# Patient Record
Sex: FEMALE | Race: WHITE | NOT HISPANIC OR LATINO | Employment: OTHER | URBAN - METROPOLITAN AREA
[De-identification: names, ages, dates, MRNs, and addresses within clinical notes are randomized per-mention and may not be internally consistent; named-entity substitution may affect disease eponyms.]

---

## 2019-08-05 ENCOUNTER — INITIAL CONSULT (OUTPATIENT)
Dept: ORTHOPEDICS | Facility: CLINIC | Age: 34
End: 2019-08-05
Payer: COMMERCIAL

## 2019-08-05 VITALS — BODY MASS INDEX: 19.42 KG/M2 | WEIGHT: 113.75 LBS | HEIGHT: 64 IN

## 2019-08-05 DIAGNOSIS — M54.12 CERVICAL RADICULOPATHY: Primary | ICD-10-CM

## 2019-08-05 PROCEDURE — 99204 PR OFFICE/OUTPT VISIT, NEW, LEVL IV, 45-59 MIN: ICD-10-PCS | Mod: S$GLB,,, | Performed by: ORTHOPAEDIC SURGERY

## 2019-08-05 PROCEDURE — 3008F PR BODY MASS INDEX (BMI) DOCUMENTED: ICD-10-PCS | Mod: CPTII,S$GLB,, | Performed by: ORTHOPAEDIC SURGERY

## 2019-08-05 PROCEDURE — 3008F BODY MASS INDEX DOCD: CPT | Mod: CPTII,S$GLB,, | Performed by: ORTHOPAEDIC SURGERY

## 2019-08-05 PROCEDURE — 99999 PR PBB SHADOW E&M-NEW PATIENT-LVL III: CPT | Mod: PBBFAC,,, | Performed by: ORTHOPAEDIC SURGERY

## 2019-08-05 PROCEDURE — 99204 OFFICE O/P NEW MOD 45 MIN: CPT | Mod: S$GLB,,, | Performed by: ORTHOPAEDIC SURGERY

## 2019-08-05 PROCEDURE — 99999 PR PBB SHADOW E&M-NEW PATIENT-LVL III: ICD-10-PCS | Mod: PBBFAC,,, | Performed by: ORTHOPAEDIC SURGERY

## 2019-08-05 RX ORDER — GABAPENTIN 100 MG/1
200 CAPSULE ORAL NIGHTLY
COMMUNITY
End: 2020-11-17

## 2019-08-05 RX ORDER — CYCLOBENZAPRINE HCL 10 MG
10 TABLET ORAL WEEKLY
COMMUNITY
End: 2020-11-17

## 2019-08-05 RX ORDER — MELOXICAM 15 MG/1
15 TABLET ORAL DAILY
Qty: 30 TABLET | Refills: 1 | Status: SHIPPED | OUTPATIENT
Start: 2019-08-05 | End: 2019-10-03 | Stop reason: SDUPTHER

## 2019-08-09 NOTE — PROGRESS NOTES
DATE: 8/9/2019  PATIENT: Dali Cadena    Attending Physician: Vince Swenson M.D.    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 The pain has been present since February 2017.  At that point she had the spontaneous onset of neck pain. For sometime she did have associated right upper extremity radiculopathy, although at this point the pain has migrated into her right periscapular region. The patient describes the pain as burning and aching aching in the cervical region as well as right periscapular region. The pain is worse with sitting and improved by not sleeping on the right side.  She has had no prior episodes of this. There is not current leave any associated numbness and tingling. There is mild right upper extremity subjective weakness. Prior treatments have included physical therapy, chiropractic care, gabapentin, occasional Flexeril, and 2 epidural steroid injections that were not significantly helpful but no anti-inflammatory medicines    The Patient denies myelopathic symptoms such as handwriting changes or difficulty with buttons/coins/keys. Denies perineal paresthesias, bowel/bladder dysfunction.    PAST MEDICAL/SURGICAL HISTORY:  History reviewed. No pertinent past medical history.  History reviewed. No pertinent surgical history.    Current Medications:   Current Outpatient Medications:     cyclobenzaprine (FLEXERIL) 10 MG tablet, Take 10 mg by mouth once a week., Disp: , Rfl:     gabapentin (NEURONTIN) 100 MG capsule, Take 200 mg by mouth every evening., Disp: , Rfl:     meloxicam (MOBIC) 15 MG tablet, Take 1 tablet (15 mg total) by mouth once daily., Disp: 30 tablet, Rfl: 1    Social History:   Social History     Socioeconomic History    Marital status:      Spouse name: Not on file    Number of children: Not on file    Years of education: Not on  "file    Highest education level: Not on file   Occupational History    Not on file   Social Needs    Financial resource strain: Not on file    Food insecurity:     Worry: Not on file     Inability: Not on file    Transportation needs:     Medical: Not on file     Non-medical: Not on file   Tobacco Use    Smoking status: Never Smoker   Substance and Sexual Activity    Alcohol use: Not on file    Drug use: Not on file    Sexual activity: Not on file   Lifestyle    Physical activity:     Days per week: Not on file     Minutes per session: Not on file    Stress: Not on file   Relationships    Social connections:     Talks on phone: Not on file     Gets together: Not on file     Attends Mu-ism service: Not on file     Active member of club or organization: Not on file     Attends meetings of clubs or organizations: Not on file     Relationship status: Not on file   Other Topics Concern    Not on file   Social History Narrative    Not on file       REVIEW OF SYSTEMS:  Constitution: Negative. Negative for chills, fever and night sweats.   Cardiovascular: Negative for chest pain and syncope.   Respiratory: Negative for cough and shortness of breath.   Gastrointestinal: See HPI. Negative for nausea/vomiting. Negative for abdominal pain.  Genitourinary: See HPI. Negative for discoloration or dysuria.  Skin: Negative for dry skin, itching and rash.   Hematologic/Lymphatic:  Negative for bleeding/clotting disorders.   Musculoskeletal: Negative for falls and muscle weakness.   Neurological: See HPI.  No history of seizures.  No history of cranial surgery or shunts.  Endocrine: Negative for polydipsia, polyphagia and polyuria.   Allergic/Immunologic: Negative for hives and persistent infections.  Psychiatric/Behavioral: Negative for depression and insomnia.         EXAM:  Ht 5' 3.5" (1.613 m)   Wt 51.6 kg (113 lb 12.1 oz)   BMI 19.84 kg/m²     General: The patient is a very pleasant 34 y.o. female in no " apparent distress, the patient is orientatied to person, place and time.  Psych: Normal mood and affect  HEENT: Vision grossly intact, hearing intact to the spoken word.  Lungs: Respirations unlabored.  Gait: Normal station and gait, no difficulty with toe or heel walk.   Skin: Cervical skin negative for rashes, lesions, hairy patches and surgical scars.  Range of motion: Cervical range of motion is acceptable. There is mild posterior cervical tenderness to palpation.  Spinal Balance: Global saggital and coronal spinal balance acceptable, no significant for scoliosis and kyphosis.  Musculoskeletal: No pain with the range of motion of the bilateral shoulders and elbows. Normal bulk and contour of the bilateral hands.  Vascular: Bilateral hands warm and well perfused, Radial pulses 2+ bilaterally.  Neurological: Normal strength and tone in all major motor groups in the bilateral upper and lower extremities with the exception of the right triceps which is graded as 4/5. Normal sensation to light touch in the C5-T1 and L2-S1 dermatomes bilaterally.  Deep tendon reflexes symmetric 2+ in the bilateral upper and lower extremities.  Negative Inverted Radial Reflex and Naylor's bilaterally. Negative Babinski bilaterally.     IMAGING:   Today I personally reviewed AP, Lat and Flex/Ex  upright C-spine films that demonstrate no significant loss of disc height.  Recent MRI of her cervical spine demonstrates a minimal right-sided C6-7 disc bulge     Body mass index is 19.84 kg/m².  No results found for: HGBA1C    ASSESSMENT/PLAN:  Cervical radiculopathy  -     Ambulatory Referral to Physical/Occupational Therapy    Other orders  -     meloxicam (MOBIC) 15 MG tablet; Take 1 tablet (15 mg total) by mouth once daily.  Dispense: 30 tablet; Refill: 1       Today we discussed options, certainly the patient has pain but overall she has a fairly small disc herniation.  At this point I do not recommend surgery, which I likely think would  consist of a disc replacement. For now we will try a course of Mobic as well as physical therapy.  She has not recently tried anti-inflammatory medicines.  We will see how she does.

## 2019-10-03 DIAGNOSIS — M54.12 CERVICAL RADICULOPATHY: ICD-10-CM

## 2019-10-03 DIAGNOSIS — M54.12 CERVICAL RADICULOPATHY: Primary | ICD-10-CM

## 2019-10-03 RX ORDER — MELOXICAM 15 MG/1
15 TABLET ORAL DAILY
Qty: 30 TABLET | Refills: 1 | Status: SHIPPED | OUTPATIENT
Start: 2019-10-03 | End: 2019-10-03 | Stop reason: SDUPTHER

## 2019-10-04 RX ORDER — MELOXICAM 15 MG/1
TABLET ORAL
Qty: 90 TABLET | Refills: 1 | Status: SHIPPED | OUTPATIENT
Start: 2019-10-04 | End: 2020-11-17

## 2019-10-30 ENCOUNTER — OFFICE VISIT (OUTPATIENT)
Dept: ORTHOPEDICS | Facility: CLINIC | Age: 34
End: 2019-10-30
Payer: COMMERCIAL

## 2019-10-30 VITALS — HEIGHT: 64 IN | BODY MASS INDEX: 18.5 KG/M2 | WEIGHT: 108.38 LBS

## 2019-10-30 DIAGNOSIS — M54.12 CERVICAL RADICULOPATHY: Primary | ICD-10-CM

## 2019-10-30 PROCEDURE — 99212 PR OFFICE/OUTPT VISIT, EST, LEVL II, 10-19 MIN: ICD-10-PCS | Mod: S$GLB,,, | Performed by: ORTHOPAEDIC SURGERY

## 2019-10-30 PROCEDURE — 99999 PR PBB SHADOW E&M-EST. PATIENT-LVL II: CPT | Mod: PBBFAC,,, | Performed by: ORTHOPAEDIC SURGERY

## 2019-10-30 PROCEDURE — 3008F BODY MASS INDEX DOCD: CPT | Mod: CPTII,S$GLB,, | Performed by: ORTHOPAEDIC SURGERY

## 2019-10-30 PROCEDURE — 99212 OFFICE O/P EST SF 10 MIN: CPT | Mod: S$GLB,,, | Performed by: ORTHOPAEDIC SURGERY

## 2019-10-30 PROCEDURE — 99999 PR PBB SHADOW E&M-EST. PATIENT-LVL II: ICD-10-PCS | Mod: PBBFAC,,, | Performed by: ORTHOPAEDIC SURGERY

## 2019-10-30 PROCEDURE — 3008F PR BODY MASS INDEX (BMI) DOCUMENTED: ICD-10-PCS | Mod: CPTII,S$GLB,, | Performed by: ORTHOPAEDIC SURGERY

## 2019-11-01 NOTE — PROGRESS NOTES
The patient returns for follow-up.  I saw her a few months ago for cervical radiculopathy secondary to a small cervical disc herniation.  She did physical therapy and is taking meloxicam and is doing very well. She has minimal symptoms.  She is apprehensive to stop taking the meloxicam because she is concerned that her symptoms will return.    Today we discussed that I think is very reasonable for her to transition to naproxen.  We will see how she does with that    I spent 10 minutes with the patient of which greater than 1/2 the time was devoted to counciling the patient regarding treatment options.

## 2020-04-26 DIAGNOSIS — M54.12 CERVICAL RADICULOPATHY: ICD-10-CM

## 2020-04-27 RX ORDER — MELOXICAM 15 MG/1
TABLET ORAL
Qty: 90 TABLET | Refills: 1 | OUTPATIENT
Start: 2020-04-27

## 2020-06-17 RX ORDER — MELOXICAM 15 MG/1
TABLET ORAL
Qty: 90 TABLET | Refills: 1 | OUTPATIENT
Start: 2020-06-17

## 2020-10-09 ENCOUNTER — TELEPHONE (OUTPATIENT)
Dept: OBSTETRICS AND GYNECOLOGY | Facility: CLINIC | Age: 35
End: 2020-10-09

## 2020-10-09 NOTE — TELEPHONE ENCOUNTER
----- Message from Maite Arrington sent at 10/9/2020  2:22 PM CDT -----  Regarding: Patient  Who Called: LEATHA BARBER  Who Left Message for Patient:Dina  Does the patient know what this is regarding? YES Best Call Back Number:[phone] Additional Information:

## 2020-10-14 ENCOUNTER — LAB VISIT (OUTPATIENT)
Dept: LAB | Facility: OTHER | Age: 35
End: 2020-10-14
Attending: OBSTETRICS & GYNECOLOGY
Payer: COMMERCIAL

## 2020-10-14 ENCOUNTER — OFFICE VISIT (OUTPATIENT)
Dept: OBSTETRICS AND GYNECOLOGY | Facility: CLINIC | Age: 35
End: 2020-10-14
Payer: COMMERCIAL

## 2020-10-14 VITALS
SYSTOLIC BLOOD PRESSURE: 120 MMHG | BODY MASS INDEX: 18.33 KG/M2 | DIASTOLIC BLOOD PRESSURE: 72 MMHG | WEIGHT: 107.38 LBS | HEIGHT: 64 IN

## 2020-10-14 DIAGNOSIS — R22.32 LUMP IN ARMPIT, LEFT: ICD-10-CM

## 2020-10-14 DIAGNOSIS — Z11.3 SCREEN FOR STD (SEXUALLY TRANSMITTED DISEASE): ICD-10-CM

## 2020-10-14 DIAGNOSIS — Z87.42 HISTORY OF ABNORMAL CERVICAL PAP SMEAR: ICD-10-CM

## 2020-10-14 DIAGNOSIS — Z01.419 ENCOUNTER FOR GYNECOLOGICAL EXAMINATION WITHOUT ABNORMAL FINDING: Primary | ICD-10-CM

## 2020-10-14 PROCEDURE — 86703 HIV-1/HIV-2 1 RESULT ANTBDY: CPT

## 2020-10-14 PROCEDURE — 36415 COLL VENOUS BLD VENIPUNCTURE: CPT

## 2020-10-14 PROCEDURE — 3008F BODY MASS INDEX DOCD: CPT | Mod: CPTII,S$GLB,, | Performed by: OBSTETRICS & GYNECOLOGY

## 2020-10-14 PROCEDURE — 88175 CYTOPATH C/V AUTO FLUID REDO: CPT

## 2020-10-14 PROCEDURE — 86592 SYPHILIS TEST NON-TREP QUAL: CPT

## 2020-10-14 PROCEDURE — 99385 PREV VISIT NEW AGE 18-39: CPT | Mod: S$GLB,,, | Performed by: OBSTETRICS & GYNECOLOGY

## 2020-10-14 PROCEDURE — 99385 PR PREVENTIVE VISIT,NEW,18-39: ICD-10-PCS | Mod: S$GLB,,, | Performed by: OBSTETRICS & GYNECOLOGY

## 2020-10-14 PROCEDURE — 99999 PR PBB SHADOW E&M-EST. PATIENT-LVL III: CPT | Mod: PBBFAC,,, | Performed by: OBSTETRICS & GYNECOLOGY

## 2020-10-14 PROCEDURE — 3008F PR BODY MASS INDEX (BMI) DOCUMENTED: ICD-10-PCS | Mod: CPTII,S$GLB,, | Performed by: OBSTETRICS & GYNECOLOGY

## 2020-10-14 PROCEDURE — 87624 HPV HI-RISK TYP POOLED RSLT: CPT

## 2020-10-14 PROCEDURE — 87491 CHLMYD TRACH DNA AMP PROBE: CPT

## 2020-10-14 PROCEDURE — 99999 PR PBB SHADOW E&M-EST. PATIENT-LVL III: ICD-10-PCS | Mod: PBBFAC,,, | Performed by: OBSTETRICS & GYNECOLOGY

## 2020-10-14 PROCEDURE — 80074 ACUTE HEPATITIS PANEL: CPT

## 2020-10-14 RX ORDER — DEXTROAMPHETAMINE SACCHARATE, AMPHETAMINE ASPARTATE MONOHYDRATE, DEXTROAMPHETAMINE SULFATE AND AMPHETAMINE SULFATE 2.5; 2.5; 2.5; 2.5 MG/1; MG/1; MG/1; MG/1
CAPSULE, EXTENDED RELEASE ORAL
COMMUNITY
Start: 2020-08-18

## 2020-10-14 NOTE — LETTER
October 14, 2020      Edison Dsouza MD  [address] [facility] [address]           [facility] [address]  [address] [address]  Phone: [phone]  Fax: [phone]          Patient: Dali Cadena   MR Number: [id number]   YOB: 1985   Date of Visit: 10/14/2020       Dear Edison Dsouza:    Thank you for referring Dali Cadena to me for evaluation. Attached you will find relevant portions of my assessment and plan of care.    If you have questions, please do not hesitate to call me. I look forward to following Dali Cadena along with you.    Sincerely,    Skylar Solorzano MD    Enclosure  CC:  No Recipients    If you would like to receive this communication electronically, please contact [email or web address] or [phone] to request more information on [employer] access.    For providers and/or their staff who would like to refer a patient to [facility], please contact us through our one-stop-shop provider referral line, [facility], at [phone].    If you feel you have received this communication in error or would no longer like to receive these types of communications, please e-mail [email or web address]

## 2020-10-14 NOTE — PROGRESS NOTES
"CC: Well woman exam    Dali Cadena is a 35 y.o. female  presents for a well woman exam.  She had abnormal pap at [facility].  She wants a pap here with us today  NL cycles  She has a left axilla lump that she has followed with doctor in the past. She finished breastfeeding three years ago.    History reviewed. No pertinent past medical history.    History reviewed. No pertinent surgical history.    OB History    Para Term  AB Living   1 1 1     1   SAB TAB Ectopic Multiple Live Births           1      # Outcome Date GA Lbr Tobi/2nd Weight Sex Delivery Anes PTL Lv   1 Term 16 42w0d   F Vag-Spont EPI  GER       History reviewed. No pertinent family history.    Social History     Tobacco Use    Smoking status: Never Smoker    Smokeless tobacco: Never Used   Substance Use Topics    Alcohol use: Not Currently     Frequency: Never    Drug use: Never       /72   Ht 5' 3.5" (1.613 m)   Wt 48.7 kg (107 lb 5.8 oz)   LMP 2020 (Approximate)   BMI 18.72 kg/m²     ROS:  GENERAL: Denies weight gain or weight loss. Feeling well overall.   SKIN: Denies rash or lesions.   HEAD: Denies head injury or headache.   NODES: Denies enlarged lymph nodes.   CHEST: Denies chest pain or shortness of breath.   CARDIOVASCULAR: Denies palpitations or left sided chest pain.   ABDOMEN: No abdominal pain, constipation, diarrhea, nausea, vomiting or rectal bleeding.   URINARY: No frequency, dysuria, hematuria, or burning on urination.  REPRODUCTIVE: See HPI.   BREASTS: The patient performs breast self-examination and denies pain, has a left axilla lump, NO  nipple discharge.   HEMATOLOGIC: No easy bruisability or excessive bleeding.  MUSCULOSKELETAL: Denies joint pain or swelling.   NEUROLOGIC: Denies syncope or weakness.   PSYCHIATRIC: Denies depression, anxiety or mood swings.    Physical Exam:    APPEARANCE: Well nourished, well developed, in no acute distress.  AFFECT: WNL, alert and oriented x " 3  SKIN: No acne or hirsutism  NECK: Neck symmetric without masses or thyromegaly  NODES: No inguinal, cervical, axillary, or femoral lymph node enlargement  CHEST: Good respiratory effect  ABDOMEN: Soft.  No tenderness or masses.  No hepatosplenomegaly.  No hernias.  BREASTS: Symmetrical, no skin changes or visible lesions.  Left axilla has a 3 x 2 cm palpable mass in the left axilla, NO nipple discharge bilaterally.  PELVIC: Normal external genitalia without lesions.  Normal hair distribution.  Adequate perineal body, normal urethral meatus.  Vagina moist and well rugated without lesions or discharge.  Cervix pink, without lesions, discharge or tenderness.  No significant cystocele or rectocele.  Bimanual exam shows uterus to be normal size, regular, mobile and nontender.  Adnexa without masses or tenderness.    EXTREMITIES: No edema.    ASSESSMENT AND PLAN  1. Encounter for gynecological examination without abnormal finding     2. Lump in armpit, left  US Breast Left Complete    Mammo Digital Diagnostic Bilat with Adam   3. History of abnormal cervical Pap smear  Liquid-Based Pap Smear, Screening    HPV High Risk Genotypes, PCR   4. Screen for STD (sexually transmitted disease)  C. trachomatis/N. gonorrhoeae by AMP DNA    HIV 1/2 Ag/Ab (4th Gen)    Hepatitis Panel, Acute    RPR       Vaginitis prevention including :   a. avoiding feminine products such as deoderant soaps, body wash, bubble bath, douches, scented toilet paper, deoderant tampons or pads, baby or feminine wipes, chronic pad use, etc. and   b. avoiding other vulvovaginal irritants such as long hot baths, humidity, tight, synthetic clothing, chlorine and sitting around in wet bathing suits and   c. wearing cotton underwear, avoiding thong underwear and no underwear to bed and   d. taking showers instead of baths and use a hair dryer on cool setting afterwards to dry and   e.wearing cotton to exercise and shower immediately after exercise and change  clothes and   f. using polyurethane condoms without spermicide if sexually active and symptoms are triggered by intercourse;   Diflucan and Mycolog cream use and potential side effects;   -pelvic rest until symptoms resolve.       Patient was counseled today on A.C.S. Pap guidelines and recommendations for yearly pelvic exams, mammograms and monthly self breast exams; to see her PCP for other health maintenance.     Follow up in about 1 year (around 10/14/2021), or if symptoms worsen or fail to improve.

## 2020-10-15 LAB
HAV IGM SERPL QL IA: NEGATIVE
HBV CORE IGM SERPL QL IA: NEGATIVE
HBV SURFACE AG SERPL QL IA: NEGATIVE
HCV AB SERPL QL IA: NEGATIVE
HIV 1+2 AB+HIV1 P24 AG SERPL QL IA: NEGATIVE
RPR SER QL: NORMAL

## 2020-10-23 ENCOUNTER — TELEPHONE (OUTPATIENT)
Dept: OBSTETRICS AND GYNECOLOGY | Facility: CLINIC | Age: 35
End: 2020-10-23

## 2020-10-23 ENCOUNTER — HOSPITAL ENCOUNTER (OUTPATIENT)
Dept: RADIOLOGY | Facility: HOSPITAL | Age: 35
Discharge: HOME | End: 2020-10-23
Attending: OBSTETRICS & GYNECOLOGY
Payer: COMMERCIAL

## 2020-10-23 DIAGNOSIS — R22.32 LUMP IN ARMPIT, LEFT: ICD-10-CM

## 2020-10-23 PROCEDURE — 77062 MAMMO DIGITAL DIAGNOSTIC BILAT WITH TOMO: ICD-10-PCS | Mod: 26,,, | Performed by: RADIOLOGY

## 2020-10-23 PROCEDURE — 76642 US BREAST LEFT LIMITED: ICD-10-PCS | Mod: 26,LT,, | Performed by: RADIOLOGY

## 2020-10-23 PROCEDURE — 76642 ULTRASOUND BREAST LIMITED: CPT | Mod: 26,LT,, | Performed by: RADIOLOGY

## 2020-10-23 PROCEDURE — 77066 DX MAMMO INCL CAD BI: CPT | Mod: 26,,, | Performed by: RADIOLOGY

## 2020-10-23 PROCEDURE — 77062 BREAST TOMOSYNTHESIS BI: CPT | Mod: TC

## 2020-10-23 PROCEDURE — 77066 MAMMO DIGITAL DIAGNOSTIC BILAT WITH TOMO: ICD-10-PCS | Mod: 26,,, | Performed by: RADIOLOGY

## 2020-10-23 PROCEDURE — 76642 ULTRASOUND BREAST LIMITED: CPT | Mod: TC,LT

## 2020-10-23 PROCEDURE — 77062 BREAST TOMOSYNTHESIS BI: CPT | Mod: 26,,, | Performed by: RADIOLOGY

## 2020-10-23 NOTE — TELEPHONE ENCOUNTER
----- Message from Lv Bal sent at 10/23/2020 11:20 AM CDT -----  PLEASE CALL PT SHE IS CALLING FOR HER RESULTS [phone]

## 2020-10-27 LAB
HPV HR 12 DNA SPEC QL NAA+PROBE: NEGATIVE
HPV16 AG SPEC QL: NEGATIVE
HPV18 DNA SPEC QL NAA+PROBE: POSITIVE

## 2020-11-05 ENCOUNTER — PATIENT MESSAGE (OUTPATIENT)
Dept: OBSTETRICS AND GYNECOLOGY | Facility: CLINIC | Age: 35
End: 2020-11-05

## 2020-11-09 ENCOUNTER — OFFICE VISIT (OUTPATIENT)
Dept: OBSTETRICS AND GYNECOLOGY | Facility: CLINIC | Age: 35
End: 2020-11-09
Payer: COMMERCIAL

## 2020-11-09 VITALS
WEIGHT: 106.5 LBS | HEIGHT: 64 IN | BODY MASS INDEX: 18.18 KG/M2 | DIASTOLIC BLOOD PRESSURE: 70 MMHG | SYSTOLIC BLOOD PRESSURE: 116 MMHG

## 2020-11-09 DIAGNOSIS — B97.7 HPV IN FEMALE: Primary | ICD-10-CM

## 2020-11-09 DIAGNOSIS — Z23 NEED FOR HPV VACCINE: ICD-10-CM

## 2020-11-09 PROCEDURE — 99213 PR OFFICE/OUTPT VISIT, EST, LEVL III, 20-29 MIN: ICD-10-PCS | Mod: 25,S$GLB,, | Performed by: OBSTETRICS & GYNECOLOGY

## 2020-11-09 PROCEDURE — 99999 PR PBB SHADOW E&M-EST. PATIENT-LVL III: CPT | Mod: PBBFAC,,, | Performed by: OBSTETRICS & GYNECOLOGY

## 2020-11-09 PROCEDURE — 90471 HPV VACCINE 9-VALENT 3 DOSE IM: ICD-10-PCS | Mod: S$GLB,,, | Performed by: OBSTETRICS & GYNECOLOGY

## 2020-11-09 PROCEDURE — 3008F BODY MASS INDEX DOCD: CPT | Mod: CPTII,S$GLB,, | Performed by: OBSTETRICS & GYNECOLOGY

## 2020-11-09 PROCEDURE — 3008F PR BODY MASS INDEX (BMI) DOCUMENTED: ICD-10-PCS | Mod: CPTII,S$GLB,, | Performed by: OBSTETRICS & GYNECOLOGY

## 2020-11-09 PROCEDURE — 99213 OFFICE O/P EST LOW 20 MIN: CPT | Mod: 25,S$GLB,, | Performed by: OBSTETRICS & GYNECOLOGY

## 2020-11-09 PROCEDURE — 1126F AMNT PAIN NOTED NONE PRSNT: CPT | Mod: S$GLB,,, | Performed by: OBSTETRICS & GYNECOLOGY

## 2020-11-09 PROCEDURE — 99999 PR PBB SHADOW E&M-EST. PATIENT-LVL III: ICD-10-PCS | Mod: PBBFAC,,, | Performed by: OBSTETRICS & GYNECOLOGY

## 2020-11-09 PROCEDURE — 90471 IMMUNIZATION ADMIN: CPT | Mod: S$GLB,,, | Performed by: OBSTETRICS & GYNECOLOGY

## 2020-11-09 PROCEDURE — 90651 HPV VACCINE 9-VALENT 3 DOSE IM: ICD-10-PCS | Mod: S$GLB,,, | Performed by: OBSTETRICS & GYNECOLOGY

## 2020-11-09 PROCEDURE — 90651 9VHPV VACCINE 2/3 DOSE IM: CPT | Mod: S$GLB,,, | Performed by: OBSTETRICS & GYNECOLOGY

## 2020-11-09 PROCEDURE — 1126F PR PAIN SEVERITY QUANTIFIED, NO PAIN PRESENT: ICD-10-PCS | Mod: S$GLB,,, | Performed by: OBSTETRICS & GYNECOLOGY

## 2020-11-11 LAB
FINAL PATHOLOGIC DIAGNOSIS: NORMAL
Lab: NORMAL

## 2020-11-17 ENCOUNTER — PROCEDURE VISIT (OUTPATIENT)
Dept: OBSTETRICS AND GYNECOLOGY | Facility: CLINIC | Age: 35
End: 2020-11-17
Payer: COMMERCIAL

## 2020-11-17 VITALS
SYSTOLIC BLOOD PRESSURE: 112 MMHG | HEIGHT: 64 IN | DIASTOLIC BLOOD PRESSURE: 66 MMHG | WEIGHT: 108.13 LBS | BODY MASS INDEX: 18.46 KG/M2

## 2020-11-17 DIAGNOSIS — B97.7 HIGH RISK HPV INFECTION: Primary | ICD-10-CM

## 2020-11-17 DIAGNOSIS — N87.9 CERVICAL DYSPLASIA: ICD-10-CM

## 2020-11-17 LAB
B-HCG UR QL: NEGATIVE
CTP QC/QA: YES

## 2020-11-17 PROCEDURE — 88305 TISSUE EXAM BY PATHOLOGIST: CPT | Mod: 59 | Performed by: PATHOLOGY

## 2020-11-17 PROCEDURE — 88305 TISSUE EXAM BY PATHOLOGIST: CPT | Mod: 26,,, | Performed by: PATHOLOGY

## 2020-11-17 PROCEDURE — 57454 COLPOSCOPY-TODAY: ICD-10-PCS | Mod: S$GLB,,, | Performed by: OBSTETRICS & GYNECOLOGY

## 2020-11-17 PROCEDURE — 88305 TISSUE EXAM BY PATHOLOGIST: ICD-10-PCS | Mod: 26,,, | Performed by: PATHOLOGY

## 2020-11-17 PROCEDURE — 57454 BX/CURETT OF CERVIX W/SCOPE: CPT | Mod: S$GLB,,, | Performed by: OBSTETRICS & GYNECOLOGY

## 2020-11-17 NOTE — PROCEDURES
Colposcopy-Today    Date/Time: 11/17/2020 11:15 AM  Performed by: Skylar Solorzano MD  Authorized by: Skylar Solorzano MD     Consent Done?:  Yes (Written)  Timeout:Immediately prior to procedure a time out was called to verify the correct patient, procedure, equipment, support staff and site/side marked as required  Prep:Patient was prepped and draped in the usual sterile fashion  Assistants?: Yes    List of assistants:  ANDREA Garcia I was present for the entire procedure.    Colposcopy Site:  Cervix   Patient was prepped and draped in the normal sterile fashion.  Acrowhite Lesion? Yes    Atypical Vessels: No    Transformation Zone Adequate?: Yes    Biopsy?: Yes         Location:  Cervix ((12 00 and 7 00))  ECC Performed?: Yes    LEEP Performed?: No    Estimated blood loss (cc):  5   Patient tolerated the procedure well with no immediate complications.

## 2020-11-23 LAB
FINAL PATHOLOGIC DIAGNOSIS: NORMAL
GROSS: NORMAL

## 2020-12-16 ENCOUNTER — OFFICE VISIT (OUTPATIENT)
Dept: OPHTHALMOLOGY | Facility: CLINIC | Age: 35
End: 2020-12-16
Payer: MEDICAID

## 2020-12-16 DIAGNOSIS — S05.31XA CONJUNCTIVAL LACERATION, RIGHT, INITIAL ENCOUNTER: Primary | ICD-10-CM

## 2020-12-16 DIAGNOSIS — H11.31 TRAUMATIC SUBCONJUNCTIVAL HEMORRHAGE OF RIGHT EYE: ICD-10-CM

## 2020-12-16 PROCEDURE — 99999 PR PBB SHADOW E&M-EST. PATIENT-LVL II: CPT | Mod: PBBFAC,,, | Performed by: OPHTHALMOLOGY

## 2020-12-16 PROCEDURE — 99212 OFFICE O/P EST SF 10 MIN: CPT | Mod: PBBFAC | Performed by: OPHTHALMOLOGY

## 2020-12-16 PROCEDURE — 92002 PR EYE EXAM, NEW PATIENT,INTERMED: ICD-10-PCS | Mod: S$PBB,,, | Performed by: OPHTHALMOLOGY

## 2020-12-16 PROCEDURE — 92002 INTRM OPH EXAM NEW PATIENT: CPT | Mod: S$PBB,,, | Performed by: OPHTHALMOLOGY

## 2020-12-16 PROCEDURE — 99999 PR PBB SHADOW E&M-EST. PATIENT-LVL II: ICD-10-PCS | Mod: PBBFAC,,, | Performed by: OPHTHALMOLOGY

## 2020-12-16 RX ORDER — OFLOXACIN 3 MG/ML
SOLUTION/ DROPS OPHTHALMIC
COMMUNITY
Start: 2020-12-10 | End: 2022-01-12

## 2020-12-16 RX ORDER — PREDNISOLONE ACETATE 10 MG/ML
SUSPENSION/ DROPS OPHTHALMIC
COMMUNITY
Start: 2020-12-10 | End: 2022-01-12

## 2020-12-16 NOTE — PROGRESS NOTES
HPI     Triage pt  S/p Lasik(Laser [facility] [place])surg OU x 1 week ago. Cat hit eye next day.  Pt states OD bulbar conj laceration.  Vision fine.  No eye pain.  Eye drops:Pred forte QID OU                    Ofloxacin QID OU    Last edited by Princess Royal MA on 12/16/2020  3:22 PM. (History)            Assessment /Plan     For exam results, see Encounter Report.    Conjunctival laceration, right, initial encounter    Traumatic subconjunctival hemorrhage of right eye      Continue current drops as prescribed. Return if signs of worsening.

## 2020-12-22 ENCOUNTER — TELEPHONE (OUTPATIENT)
Dept: OBSTETRICS AND GYNECOLOGY | Facility: CLINIC | Age: 35
End: 2020-12-22

## 2020-12-22 DIAGNOSIS — Z23 NEED FOR HPV VACCINE: Primary | ICD-10-CM

## 2020-12-22 NOTE — TELEPHONE ENCOUNTER
----- Message from Елена Mota sent at 12/22/2020 10:33 AM CST -----  Patient would like to schedule her 2nd HPV vaccine    Please contact patient at [phone]

## 2021-01-08 ENCOUNTER — CLINICAL SUPPORT (OUTPATIENT)
Dept: OBSTETRICS AND GYNECOLOGY | Facility: CLINIC | Age: 36
End: 2021-01-08
Payer: MEDICAID

## 2021-01-08 ENCOUNTER — TELEPHONE (OUTPATIENT)
Dept: OBSTETRICS AND GYNECOLOGY | Facility: CLINIC | Age: 36
End: 2021-01-08

## 2021-01-08 DIAGNOSIS — Z23 NEED FOR HPV VACCINE: ICD-10-CM

## 2021-01-08 PROCEDURE — 90471 IMMUNIZATION ADMIN: CPT | Mod: PBBFAC

## 2021-02-01 ENCOUNTER — HOSPITAL ENCOUNTER (OUTPATIENT)
Dept: RADIOLOGY | Facility: HOSPITAL | Age: 36
Discharge: HOME | End: 2021-02-01
Attending: PHYSICIAN ASSISTANT
Payer: MEDICAID

## 2021-02-01 ENCOUNTER — OFFICE VISIT (OUTPATIENT)
Dept: ORTHOPEDICS | Facility: CLINIC | Age: 36
End: 2021-02-01
Payer: MEDICAID

## 2021-02-01 VITALS
HEART RATE: 59 BPM | DIASTOLIC BLOOD PRESSURE: 76 MMHG | BODY MASS INDEX: 19.46 KG/M2 | SYSTOLIC BLOOD PRESSURE: 116 MMHG | WEIGHT: 111.63 LBS

## 2021-02-01 DIAGNOSIS — M75.82 ROTATOR CUFF TENDINITIS, LEFT: Primary | ICD-10-CM

## 2021-02-01 DIAGNOSIS — M25.512 LEFT SHOULDER PAIN, UNSPECIFIED CHRONICITY: ICD-10-CM

## 2021-02-01 DIAGNOSIS — M25.512 ACUTE PAIN OF LEFT SHOULDER: ICD-10-CM

## 2021-02-01 DIAGNOSIS — M25.512 LEFT SHOULDER PAIN, UNSPECIFIED CHRONICITY: Primary | ICD-10-CM

## 2021-02-01 PROCEDURE — 73030 X-RAY EXAM OF SHOULDER: CPT | Mod: 26,LT,, | Performed by: RADIOLOGY

## 2021-02-01 PROCEDURE — 73030 X-RAY EXAM OF SHOULDER: CPT | Mod: TC,FY,LT

## 2021-02-01 PROCEDURE — 73030 XR SHOULDER COMPLETE 2 OR MORE VIEWS LEFT: ICD-10-PCS | Mod: 26,LT,, | Performed by: RADIOLOGY

## 2021-02-01 PROCEDURE — 99999 PR PBB SHADOW E&M-EST. PATIENT-LVL III: CPT | Mod: PBBFAC,,, | Performed by: PHYSICIAN ASSISTANT

## 2021-02-01 PROCEDURE — 99999 PR PBB SHADOW E&M-EST. PATIENT-LVL III: ICD-10-PCS | Mod: PBBFAC,,, | Performed by: PHYSICIAN ASSISTANT

## 2021-02-01 PROCEDURE — 99213 OFFICE O/P EST LOW 20 MIN: CPT | Mod: PBBFAC,25,PN | Performed by: PHYSICIAN ASSISTANT

## 2021-02-01 PROCEDURE — 99214 PR OFFICE/OUTPT VISIT, EST, LEVL IV, 30-39 MIN: ICD-10-PCS | Mod: S$PBB,,, | Performed by: PHYSICIAN ASSISTANT

## 2021-02-01 PROCEDURE — 99214 OFFICE O/P EST MOD 30 MIN: CPT | Mod: S$PBB,,, | Performed by: PHYSICIAN ASSISTANT

## 2021-02-01 RX ORDER — MELOXICAM 15 MG/1
15 TABLET ORAL DAILY
Qty: 30 TABLET | Refills: 3 | Status: SHIPPED | OUTPATIENT
Start: 2021-02-01 | End: 2021-03-03

## 2021-05-31 ENCOUNTER — TELEPHONE (OUTPATIENT)
Dept: OBSTETRICS AND GYNECOLOGY | Facility: CLINIC | Age: 36
End: 2021-05-31

## 2021-06-11 ENCOUNTER — CLINICAL SUPPORT (OUTPATIENT)
Dept: OBSTETRICS AND GYNECOLOGY | Facility: CLINIC | Age: 36
End: 2021-06-11
Payer: MEDICAID

## 2021-06-11 DIAGNOSIS — Z23 NEED FOR HPV VACCINE: ICD-10-CM

## 2021-06-11 PROCEDURE — 90471 IMMUNIZATION ADMIN: CPT | Mod: PBBFAC

## 2021-11-24 ENCOUNTER — TELEPHONE (OUTPATIENT)
Dept: OBSTETRICS AND GYNECOLOGY | Facility: CLINIC | Age: 36
End: 2021-11-24
Payer: MEDICAID

## 2022-01-12 ENCOUNTER — OFFICE VISIT (OUTPATIENT)
Dept: OBSTETRICS AND GYNECOLOGY | Facility: CLINIC | Age: 37
End: 2022-01-12
Payer: MEDICAID

## 2022-01-12 VITALS
BODY MASS INDEX: 20.12 KG/M2 | SYSTOLIC BLOOD PRESSURE: 110 MMHG | HEIGHT: 63 IN | DIASTOLIC BLOOD PRESSURE: 72 MMHG | WEIGHT: 113.56 LBS

## 2022-01-12 DIAGNOSIS — Z01.419 ENCOUNTER FOR GYNECOLOGICAL EXAMINATION WITHOUT ABNORMAL FINDING: Primary | ICD-10-CM

## 2022-01-12 PROCEDURE — 99999 PR PBB SHADOW E&M-EST. PATIENT-LVL III: CPT | Mod: PBBFAC,,, | Performed by: OBSTETRICS & GYNECOLOGY

## 2022-01-12 PROCEDURE — 3008F PR BODY MASS INDEX (BMI) DOCUMENTED: ICD-10-PCS | Mod: CPTII,,, | Performed by: OBSTETRICS & GYNECOLOGY

## 2022-01-12 PROCEDURE — 3074F PR MOST RECENT SYSTOLIC BLOOD PRESSURE < 130 MM HG: ICD-10-PCS | Mod: CPTII,,, | Performed by: OBSTETRICS & GYNECOLOGY

## 2022-01-12 PROCEDURE — 3078F PR MOST RECENT DIASTOLIC BLOOD PRESSURE < 80 MM HG: ICD-10-PCS | Mod: CPTII,,, | Performed by: OBSTETRICS & GYNECOLOGY

## 2022-01-12 PROCEDURE — 1160F RVW MEDS BY RX/DR IN RCRD: CPT | Mod: CPTII,,, | Performed by: OBSTETRICS & GYNECOLOGY

## 2022-01-12 PROCEDURE — 99395 PR PREVENTIVE VISIT,EST,18-39: ICD-10-PCS | Mod: S$PBB,,, | Performed by: OBSTETRICS & GYNECOLOGY

## 2022-01-12 PROCEDURE — 87624 HPV HI-RISK TYP POOLED RSLT: CPT | Performed by: OBSTETRICS & GYNECOLOGY

## 2022-01-12 PROCEDURE — 88141 PR  CYTOPATH CERV/VAG INTERPRET: ICD-10-PCS | Mod: ,,, | Performed by: PATHOLOGY

## 2022-01-12 PROCEDURE — 3074F SYST BP LT 130 MM HG: CPT | Mod: CPTII,,, | Performed by: OBSTETRICS & GYNECOLOGY

## 2022-01-12 PROCEDURE — 1159F MED LIST DOCD IN RCRD: CPT | Mod: CPTII,,, | Performed by: OBSTETRICS & GYNECOLOGY

## 2022-01-12 PROCEDURE — 88141 CYTOPATH C/V INTERPRET: CPT | Mod: ,,, | Performed by: PATHOLOGY

## 2022-01-12 PROCEDURE — 3078F DIAST BP <80 MM HG: CPT | Mod: CPTII,,, | Performed by: OBSTETRICS & GYNECOLOGY

## 2022-01-12 PROCEDURE — 1160F PR REVIEW ALL MEDS BY PRESCRIBER/CLIN PHARMACIST DOCUMENTED: ICD-10-PCS | Mod: CPTII,,, | Performed by: OBSTETRICS & GYNECOLOGY

## 2022-01-12 PROCEDURE — 1159F PR MEDICATION LIST DOCUMENTED IN MEDICAL RECORD: ICD-10-PCS | Mod: CPTII,,, | Performed by: OBSTETRICS & GYNECOLOGY

## 2022-01-12 PROCEDURE — 3008F BODY MASS INDEX DOCD: CPT | Mod: CPTII,,, | Performed by: OBSTETRICS & GYNECOLOGY

## 2022-01-12 PROCEDURE — 88175 CYTOPATH C/V AUTO FLUID REDO: CPT | Performed by: PATHOLOGY

## 2022-01-12 PROCEDURE — 99395 PREV VISIT EST AGE 18-39: CPT | Mod: S$PBB,,, | Performed by: OBSTETRICS & GYNECOLOGY

## 2022-01-12 PROCEDURE — 99213 OFFICE O/P EST LOW 20 MIN: CPT | Mod: PBBFAC | Performed by: OBSTETRICS & GYNECOLOGY

## 2022-01-12 PROCEDURE — 99999 PR PBB SHADOW E&M-EST. PATIENT-LVL III: ICD-10-PCS | Mod: PBBFAC,,, | Performed by: OBSTETRICS & GYNECOLOGY

## 2022-01-12 NOTE — PROGRESS NOTES
"CC: Well woman exam    Dali Cadena is a 36 y.o. female  presents for a well woman exam.  She has no issues, problems, or complaints.  History of HPV. NL pap,  HPV present last year    History reviewed. No pertinent past medical history.    History reviewed. No pertinent surgical history.    OB History    Para Term  AB Living   1 1 1     1   SAB IAB Ectopic Multiple Live Births           1      # Outcome Date GA Lbr Tobi/2nd Weight Sex Delivery Anes PTL Lv   1 Term 16 42w0d   F Vag-Spont EPI  GER       Family History   Problem Relation Age of Onset    Stroke Paternal Grandmother     Hypertension Paternal Grandmother        Social History     Tobacco Use    Smoking status: Never Smoker    Smokeless tobacco: Never Used   Substance Use Topics    Alcohol use: Not Currently    Drug use: Never       /72   Ht 5' 3" (1.6 m)   Wt 51.5 kg (113 lb 8.6 oz)   LMP  (LMP Unknown)   BMI 20.11 kg/m²     ROS:  GENERAL: Denies weight gain or weight loss. Feeling well overall.   SKIN: Denies rash or lesions.   HEAD: Denies head injury or headache.   NODES: Denies enlarged lymph nodes.   CHEST: Denies chest pain or shortness of breath.   CARDIOVASCULAR: Denies palpitations or left sided chest pain.   ABDOMEN: No abdominal pain, constipation, diarrhea, nausea, vomiting or rectal bleeding.   URINARY: No frequency, dysuria, hematuria, or burning on urination.  REPRODUCTIVE: See HPI.   BREASTS: The patient performs breast self-examination and denies pain, lumps, or nipple discharge.   HEMATOLOGIC: No easy bruisability or excessive bleeding.  MUSCULOSKELETAL: Denies joint pain or swelling.   NEUROLOGIC: Denies syncope or weakness.   PSYCHIATRIC: Denies depression, anxiety or mood swings.    Physical Exam:    APPEARANCE: Well nourished, well developed, in no acute distress.  AFFECT: WNL, alert and oriented x 3  SKIN: No acne or hirsutism  NECK: Neck symmetric without masses or thyromegaly  NODES: No " inguinal, cervical, axillary, or femoral lymph node enlargement  CHEST: Good respiratory effect  ABDOMEN: Soft.  No tenderness or masses.  No hepatosplenomegaly.  No hernias.  BREASTS: Symmetrical, no skin changes or visible lesions.  No palpable masses, nipple discharge bilaterally.  PELVIC: Normal external genitalia without lesions.  Normal hair distribution.  Adequate perineal body, normal urethral meatus.  Vagina moist and well rugated without lesions or discharge.  Cervix pink, without lesions, discharge or tenderness.  No significant cystocele or rectocele.  Bimanual exam shows uterus to be normal size, regular, mobile and nontender.  Adnexa without masses or tenderness.    EXTREMITIES: No edema.    ASSESSMENT AND PLAN  1. Encounter for gynecological examination without abnormal finding  Liquid-Based Pap Smear, Screening    HPV High Risk Genotypes, PCR       Patient was counseled today on A.C.S. Pap guidelines and recommendations for yearly pelvic exams, mammograms and monthly self breast exams; to see her PCP for other health maintenance.     Follow up in about 1 year (around 1/12/2023), or if symptoms worsen or fail to improve.

## 2022-01-21 LAB
FINAL PATHOLOGIC DIAGNOSIS: ABNORMAL
HPV HR 12 DNA SPEC QL NAA+PROBE: NEGATIVE
HPV16 AG SPEC QL: NEGATIVE
HPV18 DNA SPEC QL NAA+PROBE: POSITIVE
Lab: ABNORMAL

## 2022-01-23 ENCOUNTER — PATIENT MESSAGE (OUTPATIENT)
Dept: OBSTETRICS AND GYNECOLOGY | Facility: CLINIC | Age: 37
End: 2022-01-23
Payer: MEDICAID

## 2022-01-24 ENCOUNTER — TELEPHONE (OUTPATIENT)
Dept: OBSTETRICS AND GYNECOLOGY | Facility: CLINIC | Age: 37
End: 2022-01-24
Payer: MEDICAID

## 2022-01-24 NOTE — TELEPHONE ENCOUNTER
Su Solorzano,     It was nice to see you again at my last appt.     Alicia reviewed the results. It appears unfortunately that the HPV hasnt cleared up. Also that abnormal cells and lesions were found? Is this correct?      Is it time to explore colposcopy, cryotherapy or LEEP?     Best regards

## 2022-02-02 ENCOUNTER — CLINICAL SUPPORT (OUTPATIENT)
Dept: REHABILITATION | Facility: HOSPITAL | Age: 37
End: 2022-02-02
Payer: MEDICAID

## 2022-02-02 DIAGNOSIS — M54.50 CHRONIC RIGHT-SIDED LOW BACK PAIN WITHOUT SCIATICA: ICD-10-CM

## 2022-02-02 DIAGNOSIS — G89.29 CHRONIC RIGHT-SIDED LOW BACK PAIN WITHOUT SCIATICA: ICD-10-CM

## 2022-02-02 DIAGNOSIS — M25.551 PAIN IN RIGHT HIP: Primary | ICD-10-CM

## 2022-02-02 PROCEDURE — 97162 PT EVAL MOD COMPLEX 30 MIN: CPT

## 2022-02-02 PROCEDURE — 97110 THERAPEUTIC EXERCISES: CPT

## 2022-02-02 PROCEDURE — 97140 MANUAL THERAPY 1/> REGIONS: CPT

## 2022-02-02 NOTE — PLAN OF CARE
[facility]  Physical Therapy Initial Evaluation    Date: 2/2/2022   Name: Dali Cadena  Clinic Number: [id number]    Therapy Diagnosis:   Encounter Diagnoses   Name Primary?    Pain in right hip Yes    Chronic right-sided low back pain without sciatica      Physician: Edison Dsouza MD    Physician Orders: PT Eval and Treat   Medical Diagnosis from Referral: M25.551 (ICD-10-CM) - Right hip pain  Evaluation Date: 2/2/2022  Authorization Period Expiration: 02/16/2022  Plan of Care Expiration: 5/2/2022  Visit # / Visits authorized: 1/ 1    Time In: 1405  Time Out: 1500  Total Appointment Time (timed & untimed codes): 55 minutes    Precautions: Standard    Subjective   Date of onset: College -> aggravated 6 months ago  History of current condition - Dali reports:     Patient reports onset of R hip pain in college as a runner. Pain similar to as it is now, with burning sensation in groin and some pain into R low back / hip. Patient had ART performed and this alleviated symptoms for a long period of time, but occasionally she noted pain with certain activities e.g. mountain biking. She no longer runs and has modified activities to deal with symptoms, but 6 months ago symptoms became more pronounced. Pain noted after Frisbee and lifting. She has been seen by other PT's over time with minimal relief. She comes to clinic to further address symptoms so that she can return to higher level activities.    Currently pain is aggravated with higher level activities, alleviated by rest. Some discomfort with sitting reported.     No imaging performed. Inconclusive on use of NSAIDs, but flexoril has helped in the past.      Medical History:   No past medical history on file.    Surgical History:   Dali Cadena  has no past surgical history on file.    Medications:   Dali has a current medication list which includes the following prescription(s): dextroamphetamine-amphetamine.    Allergies:   Review of  patient's allergies indicates:  No Known Allergies     Imaging, none:     Prior Therapy: yes  Social History:  lives with their family  Occupation:   Prior Level of Function: high level activities with some discomfort  Current Level of Function: high level activities and sitting pain for a day or two.    Pain:  Current 2/10, worst 7/10, best 2/10   Location: { right hip(s)  Description: Aching, Dull and Sharp  Aggravating Factors: Sitting, Walking and Lifting  Easing Factors: rest and hip flexor stretch on FR.    Pts goals: higher level activities w/o pain    Objective     Observation / Gait: inadequate nutation of SIJ    Posture: APT    Hip Range of Motion:   Right Passive Left Passive   Flexion 125 125   Extension 30 20   Ext. Rotation 75 75   Int. Rotation 20 30     Lower Extremity Strength  Right LE  Left LE    Quadriceps: 5/5 (RF dom) Quadriceps: 5/5   Hamstrings: 5/5 Hamstrings: 5/5   Hip Flexion: 4+/5 Hip Flexion: 4-/5   Hip Extension:  3+/5 Hip Extension: 3+/5   Hip ER: 4-/5 Hip ER: 4-/5   Hip IR: 3+/5 Hip IR: 4-/5   Hip ABD: 3+/5 Hip ABD: 3+/5     Flexibility:   90/90 Hamstrings: R = - ; L = -   Everardo's test: R = + ; L = np    Prem Test: Right  Left    Iliopsoas + +   Rectus Femoris  - -     Joint Mobility: hypomobile inferior and anterior glide    Special Tests:   FABERs: +  JALYN: -  Assisted SLR (SIJ stability): + on R  SIJ cluster (thigh, sacral     Functional Tests:  SL Squat Test: R: excessive valgus ; L valgus  DL Squat: WNL though not pas 90 deg      Limitation/Restriction for FOTO Hip Survey    Therapist reviewed FOTO scores for Dali Cadena on 2/2/2022.   FOTO documents entered into [employer] - see Media section.    Limitation Score: 66%         TREATMENT   Treatment Time In: 1437  Treatment Time Out: 1500  Total Treatment time (time-based codes) separate from Evaluation: 23 minutes    Dali received therapeutic exercises to develop strength, endurance, ROM, flexibility, posture and core  stabilization for 15 minutes including:    Patient education - diagnosis, prognosis, impairments, importance of core / hip stability without R hip flexor hyperactivity;  Treating symptoms; avoiding core with hip flexor use.    HEP:  Sideplank on knees x fail  Sideplank w/ clam x10 ea  Plank on elbows x fail  Plank on hands x fail  Plank shoulder taps x fail  Clam hold in neutral hip 10x10 sec ea    Next visit - SL hip abd / clam at wall; Woodpecker (PF use)    Dali received the following manual therapy techniques: Joint mobilizations were applied to the: R hip for 8 minutes, including:    Assessment  Inferior and anterior glides Grade II-III    Next visit - Cont mobs initially; possible DN    Home Exercises and Patient Education Provided    Education provided:   - see above    Written Home Exercises Provided: yes.  Exercises were reviewed and patient was able to demonstrate them prior to the end of the session.  Patient demonstrated good  understanding of the education provided.     See EMR under Patient Instructions for exercisesprovided 2/2/2022.    Assessment   Dali is a 36 y.o. female referred to outpatient Physical Therapy with a medical diagnosis of M25.551 (ICD-10-CM) - Right hip pain. Pt presents with pain, + SIJ and labral testing, + hip flexor length deficits, joint hypomobility with reduce ROM into hip ext > IR, and core / hip stability deficits. Impairments leading to burning groin pain and some LBP with higher level activities.    Pt prognosis is Fair.   Pt will benefit from skilled outpatient Physical Therapy to address the deficits stated above and in the chart below, provide pt/family education, and to maximize pt's level of independence.     Plan of care discussed with patient: Yes  Pt's spiritual, cultural and educational needs considered and patient is agreeable to the plan of care and goals as stated below:     Anticipated Barriers for therapy: chronicity    Medical Necessity is demonstrated by  the following  History  Co-morbidities and personal factors that may impact the plan of care Co-morbidities:   chronicity    Personal Factors:   no deficits     moderate   Examination  Body Structures and Functions, activity limitations and participation restrictions that may impact the plan of care Body Regions:   lower extremities    Body Systems:    ROM  strength  balance  gait  transfers  motor control  motor learning  edema    Participation Restrictions:   covid-19    Activity limitations:   Learning and applying knowledge  no deficits    General Tasks and Commands  no deficits    Communication  no deficits    Mobility  lifting and carrying objects    Self care  no deficits    Domestic Life  no deficits    Interactions/Relationships  no deficits    Life Areas  no deficits    Community and Social Life  no deficits         high   Clinical Presentation evolving clinical presentation with changing clinical characteristics moderate   Decision Making/ Complexity Score: moderate     GOALS: Short Term Goals:  0-4 weeks  1.Report decreased R hip pain  < / =  3/10  At worst to increase tolerance for high level activities  2. Increase ROM to WNL degrees where limited in order to perform ADLs without difficulty.  3. Increase strength by 1/3 MMT grade in hip ER/Ext/Abd  to increase tolerance for ADL and work activities.  4. Pt to tolerate HEP to improve ROM and independence with ADL's    Long Term Goals: 5-8 weeks  1.Report decreased R hip pain < / = 1/10 at worst  to increase tolerance for high level activities  2.Patient goal: Return to frisbee and working out w/o complaint  3.Increase strength to 4+/5 in  hip ER/Ext/Abd  to increase tolerance for ADL and work activities.  4. Pt will report at CJ level (20-40% impaired) on LEFS  to demonstrate increase in LE function with every day tasks.     Plan   Plan of care Certification: 2/2/2022 to 5/2/2022.    Outpatient Physical Therapy 1 times weekly for 8 weeks to include the  following interventions: Manual Therapy, Moist Heat/ Ice, Neuromuscular Re-ed, Patient Education, Self Care, Therapeutic Activities and Therapeutic Exercise and DN    TRA PICHARDO, PT, DPT, OCS

## 2022-02-03 ENCOUNTER — TELEPHONE (OUTPATIENT)
Dept: OBSTETRICS AND GYNECOLOGY | Facility: CLINIC | Age: 37
End: 2022-02-03
Payer: MEDICAID

## 2022-02-03 NOTE — TELEPHONE ENCOUNTER
----- Message from Skylar Solorzano MD sent at 2/1/2022  8:26 AM CST -----  Patient was sent for patient to schedule colpo for abnormal pap with HPV. Please schedule appt AP

## 2022-02-18 ENCOUNTER — CLINICAL SUPPORT (OUTPATIENT)
Dept: REHABILITATION | Facility: HOSPITAL | Age: 37
End: 2022-02-18
Payer: MEDICAID

## 2022-02-18 DIAGNOSIS — M54.50 CHRONIC RIGHT-SIDED LOW BACK PAIN WITHOUT SCIATICA: ICD-10-CM

## 2022-02-18 DIAGNOSIS — G89.29 CHRONIC RIGHT-SIDED LOW BACK PAIN WITHOUT SCIATICA: ICD-10-CM

## 2022-02-18 DIAGNOSIS — M25.551 PAIN IN RIGHT HIP: ICD-10-CM

## 2022-02-18 PROCEDURE — 97110 THERAPEUTIC EXERCISES: CPT

## 2022-02-18 NOTE — PROGRESS NOTES
Physical Therapy Daily Treatment Note     Name: Dali Carrier Clinic Number: [id number]    Therapy Diagnosis: No diagnosis found.  Physician: Edison Dsouza MD    Visit Date: 2/18/2022  Physician Orders: PT Eval and Treat   Medical Diagnosis from Referral: M25.551 (ICD-10-CM) - Right hip pain  Evaluation Date: 2/2/2022  Authorization Period Expiration: 02/16/2022  Plan of Care Expiration: 5/2/2022  Visit # / Visits authorized: 2/12     Time In: 1315  Time Out:  1400  Total Appointment Time (timed & untimed codes): 45 minutes - 30 min one on one therex     Precautions: Standard    Subjective     Pt reports: Burning pain today. Did some ART, but did not help much. Has been doing HEP exercises. No changes.    She was compliant with home exercise program.  Response to previous treatment: HEP compliance  Functional change: no changes    Pain: 5/10  Location: right hip      Objective     Dali received therapeutic exercises to develop strength, endurance, ROM, flexibility, posture and core stabilization for 30 minutes including:     Patient education - diagnosis, prognosis, impairments, importance of core / hip stability without R hip flexor hyperactivity;  Treating symptoms; avoiding core with hip flexor use.    Woodpecker 10x10 sec  Heel raise x10  DL RDL 15lbs x10  SL Hip abd into wall 10x10 sec - HEP  Sidelyhip hip abd iso at wall 5x fail - cuing foot into wall / slight elevation - HEP    Next visit - Possible DN to QL / Psoas    Held:  HEP:  Sideplank on knees x fail  Sideplank w/ clam x10 ea  Plank on elbows x fail  Plank on hands x fail  Plank shoulder taps x fail  Clam hold in neutral hip 10x10 sec ea     Dali received the following manual therapy techniques: Joint mobilizations were applied to the: R hip for 15 minutes, including:     Assessment  Traction / inferior glides  Prem stretch  Prone anterior glides Grade II-III  Reciprocal inhibition with calf, HS, glutes    Home Exercises Provided and Patient  Education Provided     Education provided:   - see above    Written Home Exercises Provided: Patient instructed to cont prior HEP.  Exercises were reviewed and Dali was able to demonstrate them prior to the end of the session.  Dali demonstrated good  understanding of the education provided.     See EMR under Patient Instructions for exercises provided prior visit - Single limb/sidelying hip abd isos above    Assessment     Burning in part due to hip flexor spasm and irritation of labrum. Able to improve tissue texture of iliopsoas with manual interventions and reciprocal inhibition activities leading to reduced pain. Significant challenge with single limb and sidelying hip abd isometrics with long holds.    If no improvement in spasming DN at next visit.    Dali Is progressing well towards her goals.   Pt prognosis is Fair.     Pt will continue to benefit from skilled outpatient physical therapy to address the deficits listed in the problem list box on initial evaluation, provide pt/family education and to maximize pt's level of independence in the home and community environment.     Pt's spiritual, cultural and educational needs considered and pt agreeable to plan of care and goals.    Anticipated barriers to physical therapy: chronicity    GOALS: Short Term Goals:  0-4 weeks  1.Report decreased R hip pain  < / =  3/10  At worst to increase tolerance for high level activities  2. Increase ROM to WNL degrees where limited in order to perform ADLs without difficulty.  3. Increase strength by 1/3 MMT grade in hip ER/Ext/Abd  to increase tolerance for ADL and work activities.  4. Pt to tolerate HEP to improve ROM and independence with ADL's     Long Term Goals: 5-8 weeks  1.Report decreased R hip pain < / = 1/10 at worst  to increase tolerance for high level activities  2.Patient goal: Return to frisbee and working out w/o complaint  3.Increase strength to 4+/5 in  hip ER/Ext/Abd  to increase tolerance for ADL and  work activities.  4. Pt will report at CJ level (20-40% impaired) on LEFS  to demonstrate increase in LE function with every day tasks.     Plan     See POC in treatment section for evaluation    TRA PICHARDO, PT , DPT, OCS

## 2022-03-03 ENCOUNTER — PROCEDURE VISIT (OUTPATIENT)
Dept: OBSTETRICS AND GYNECOLOGY | Facility: CLINIC | Age: 37
End: 2022-03-03
Payer: MEDICAID

## 2022-03-03 VITALS
BODY MASS INDEX: 20.11 KG/M2 | SYSTOLIC BLOOD PRESSURE: 116 MMHG | DIASTOLIC BLOOD PRESSURE: 68 MMHG | WEIGHT: 113.56 LBS

## 2022-03-03 DIAGNOSIS — R87.810 ATYPICAL SQUAMOUS CELL CHANGES OF UNDETERMINED SIGNIFICANCE (ASCUS) ON CERVICAL CYTOLOGY WITH POSITIVE HIGH RISK HUMAN PAPILLOMA VIRUS (HPV): Primary | ICD-10-CM

## 2022-03-03 DIAGNOSIS — R87.610 ATYPICAL SQUAMOUS CELL CHANGES OF UNDETERMINED SIGNIFICANCE (ASCUS) ON CERVICAL CYTOLOGY WITH POSITIVE HIGH RISK HUMAN PAPILLOMA VIRUS (HPV): Primary | ICD-10-CM

## 2022-03-03 LAB
B-HCG UR QL: NEGATIVE
CTP QC/QA: YES

## 2022-03-03 PROCEDURE — 88305 TISSUE EXAM BY PATHOLOGIST: ICD-10-PCS | Mod: 26,,, | Performed by: PATHOLOGY

## 2022-03-03 PROCEDURE — 88342 IMHCHEM/IMCYTCHM 1ST ANTB: CPT | Mod: 26,,, | Performed by: PATHOLOGY

## 2022-03-03 PROCEDURE — 57454 COLPOSCOPY-TODAY: ICD-10-PCS | Mod: S$PBB,,, | Performed by: OBSTETRICS & GYNECOLOGY

## 2022-03-03 PROCEDURE — 88342 CHG IMMUNOCYTOCHEMISTRY: ICD-10-PCS | Mod: 26,,, | Performed by: PATHOLOGY

## 2022-03-03 PROCEDURE — 88305 TISSUE EXAM BY PATHOLOGIST: CPT | Performed by: PATHOLOGY

## 2022-03-03 PROCEDURE — 88305 TISSUE EXAM BY PATHOLOGIST: CPT | Mod: 26,,, | Performed by: PATHOLOGY

## 2022-03-03 PROCEDURE — 57454 BX/CURETT OF CERVIX W/SCOPE: CPT | Mod: PBBFAC,PN | Performed by: OBSTETRICS & GYNECOLOGY

## 2022-03-03 PROCEDURE — 81025 URINE PREGNANCY TEST: CPT | Mod: PBBFAC,PN | Performed by: OBSTETRICS & GYNECOLOGY

## 2022-03-03 PROCEDURE — 88342 IMHCHEM/IMCYTCHM 1ST ANTB: CPT | Performed by: PATHOLOGY

## 2022-03-04 ENCOUNTER — CLINICAL SUPPORT (OUTPATIENT)
Dept: REHABILITATION | Facility: HOSPITAL | Age: 37
End: 2022-03-04
Payer: MEDICAID

## 2022-03-04 DIAGNOSIS — M54.50 LOW BACK PAIN WITHOUT SCIATICA, UNSPECIFIED BACK PAIN LATERALITY, UNSPECIFIED CHRONICITY: ICD-10-CM

## 2022-03-04 DIAGNOSIS — M25.551 PAIN IN RIGHT HIP: Primary | ICD-10-CM

## 2022-03-04 PROCEDURE — 97110 THERAPEUTIC EXERCISES: CPT

## 2022-03-04 NOTE — PROGRESS NOTES
Physical Therapy Daily Treatment Note     Name: Dali [facility] Number: [id number]    Therapy Diagnosis: No diagnosis found.  Physician: Edison Dsouza MD    Visit Date: 3/4/2022  Physician Orders: PT Eval and Treat   Medical Diagnosis from Referral: M25.551 (ICD-10-CM) - Right hip pain  Evaluation Date: 2/2/2022  Authorization Period Expiration: 02/16/2022  Plan of Care Expiration: 5/2/2022  Visit # / Visits authorized: 3/12      Time In: 1120  Time Out:  1210  Total Appointment Time (timed & untimed codes): 50 minutes - 30 min one on one therex     Precautions: Standard    Subjective     Pt reports: Reduced burning and hip irritation since last seen. Has been doing less working out and more [employer] activities though. Mod compliant with HEP.    She was compliant with home exercise program.  Response to previous treatment: HEP compliance  Functional change: no changes    Pain: 5/10  Location: right hip      Objective     Dali received therapeutic exercises to develop strength, endurance, ROM, flexibility, posture and core stabilization for 30 minutes one on one including:     Patient education - diagnosis, prognosis, impairments, importance of core / hip stability without R hip flexor hyperactivity; Treating symptoms; avoiding core with hip flexor use.    Hip thrusts x10 reps  Hip thrust 3x>30 sec holds to fail  Hip L's 3x fail ea side    Held:  Woodpecker 10x10 sec  Heel raise x10  DL RDL 15lbs x10    HEP:  SL Hip abd into wall 10x10 sec - HEP  Sidelyhip hip abd iso at wall 5x fail - cuing foot into wall / slight elevation - HEP  Sideplank on knees x fail  Sideplank w/ clam x10 ea  Plank on elbows x fail  Plank on hands x fail  Plank shoulder taps x fail  Clam hold in neutral hip 10x10 sec ea     Dali received the following manual therapy techniques: Joint mobilizations were applied to the: R hip for 20 minutes, including:     Assessment  Traction / inferior glides  Prem stretch  Prone anterior glides  Grade II-III    Dry Needling:  Patient educated on intervention of dry needling including risks, benefits, and post administration expectations including potential soreness, bruising, and fatigue. Patient demonstrated verbal understanding and consented to intervention today.    DN performed to proximal and distal psoas TrP for 10 min.    Home Exercises Provided and Patient Education Provided     Education provided:   - see above    Written Home Exercises Provided: Patient instructed to cont prior HEP.  Exercises were reviewed and Dali was able to demonstrate them prior to the end of the session.  Dali demonstrated good  understanding of the education provided.     See EMR under Patient Instructions for exercises provided prior visit - Single limb/sidelying hip abd isos above    Assessment     Improved mobility and tissue texture post manual. Improved ability to recruit gluteals with therex activities. Cont to require manual prn along with hip stability therex focused on gluteal recruitment.    Dali Is progressing well towards her goals.   Pt prognosis is Fair.     Pt will continue to benefit from skilled outpatient physical therapy to address the deficits listed in the problem list box on initial evaluation, provide pt/family education and to maximize pt's level of independence in the home and community environment.     Pt's spiritual, cultural and educational needs considered and pt agreeable to plan of care and goals.    Anticipated barriers to physical therapy: chronicity    GOALS: Short Term Goals:  0-4 weeks  1.Report decreased R hip pain  < / =  3/10  At worst to increase tolerance for high level activities  2. Increase ROM to WNL degrees where limited in order to perform ADLs without difficulty.  3. Increase strength by 1/3 MMT grade in hip ER/Ext/Abd  to increase tolerance for ADL and work activities.  4. Pt to tolerate HEP to improve ROM and independence with ADL's     Long Term Goals: 5-8  weeks  1.Report decreased R hip pain < / = 1/10 at worst  to increase tolerance for high level activities  2.Patient goal: Return to frisbee and working out w/o complaint  3.Increase strength to 4+/5 in  hip ER/Ext/Abd  to increase tolerance for ADL and work activities.  4. Pt will report at CJ level (20-40% impaired) on LEFS  to demonstrate increase in LE function with every day tasks.     Plan     See POC in treatment section for evaluation    TRA PICHARDO, PT , DPT, OCS

## 2022-03-11 ENCOUNTER — CLINICAL SUPPORT (OUTPATIENT)
Dept: REHABILITATION | Facility: HOSPITAL | Age: 37
End: 2022-03-11
Payer: MEDICAID

## 2022-03-11 DIAGNOSIS — M25.551 PAIN IN RIGHT HIP: Primary | ICD-10-CM

## 2022-03-11 DIAGNOSIS — M54.50 LOW BACK PAIN WITHOUT SCIATICA, UNSPECIFIED BACK PAIN LATERALITY, UNSPECIFIED CHRONICITY: ICD-10-CM

## 2022-03-11 PROCEDURE — 97110 THERAPEUTIC EXERCISES: CPT

## 2022-03-11 NOTE — PROGRESS NOTES
Physical Therapy Daily Treatment Note     Name: Dali [facility] Number: [id number]    Therapy Diagnosis: No diagnosis found.  Physician: Edison Dsouza MD    Visit Date: 3/11/2022  Physician Orders: PT Eval and Treat   Medical Diagnosis from Referral: M25.551 (ICD-10-CM) - Right hip pain  Evaluation Date: 2/2/2022  Authorization Period Expiration: 02/16/2022  Plan of Care Expiration: 5/2/2022  Visit # / Visits authorized: 4/12      Time In: 1120   Time Out:  1200  Total Appointment Time (timed & untimed codes): 40 minutes - 25 min one on one therex     Precautions: Standard    Subjective     Pt reports: Increased compliance with HEP. Slightly better than last week. Feels she is able to activate her glutes more. Felt min changes from DN.    Next visit - FOTO    She was compliant with home exercise program.  Response to previous treatment: HEP compliance  Functional change: no changes    Pain: 3/10  Location: right hip      Objective     Dali received therapeutic exercises to develop strength, endurance, ROM, flexibility, posture and core stabilization for 35 min (25 minutes one on one) including:     Patient education - diagnosis, prognosis, impairments, importance of core / hip stability without R hip flexor hyperactivity; Treating symptoms; avoiding core with hip flexor use.    Bike x8 min Lvl 4.0 for LE endurance and joint mobility  Hip Thrust for ROM 20x3 sec hold  Mobility squat for ROM 20x3 sec  Bird dog holds 3x30 sec ea    Next Visit - Sideplank clam hold GTB    Held:  Hip thrusts x10 reps  Hip thrust 3x>30 sec holds to fail  Hip L's 3x fail ea side  DL RDL 15lbs x10  SL Hip abd into wall 10x10 sec - HEP  Sidelyhip hip abd iso at wall 5x fail - cuing foot into wall / slight elevation - HEP  Plank shoulder taps x fail  Clam hold in neutral hip 10x10 sec ea     Dali received the following manual therapy techniques: Joint mobilizations were applied to the: R hip for 5 minutes,  including:     Assessment  Traction / inferior glides  Prem stretch  Prone anterior glides Grade II-III      Home Exercises Provided and Patient Education Provided     Education provided:   - see above    Written Home Exercises Provided: Patient instructed to cont prior HEP.  Exercises were reviewed and Dali was able to demonstrate them prior to the end of the session.  Dali demonstrated good  understanding of the education provided.     See EMR under Patient Instructions for exercises provided prior visit - Single limb/sidelying hip abd isos above    Assessment     Improved mobility and tissue texture post manual leading to increased ease of gluteal recruitment through increased ROM at end of visit.    Cont to require manual prn along with hip stability therex focused on gluteal recruitment.    Dali Is progressing well towards her goals.   Pt prognosis is Fair.     Pt will continue to benefit from skilled outpatient physical therapy to address the deficits listed in the problem list box on initial evaluation, provide pt/family education and to maximize pt's level of independence in the home and community environment.     Pt's spiritual, cultural and educational needs considered and pt agreeable to plan of care and goals.    Anticipated barriers to physical therapy: chronicity    GOALS: Short Term Goals:  0-4 weeks  1.Report decreased R hip pain  < / =  3/10  At worst to increase tolerance for high level activities  2. Increase ROM to WNL degrees where limited in order to perform ADLs without difficulty.  3. Increase strength by 1/3 MMT grade in hip ER/Ext/Abd  to increase tolerance for ADL and work activities.  4. Pt to tolerate HEP to improve ROM and independence with ADL's     Long Term Goals: 5-8 weeks  1.Report decreased R hip pain < / = 1/10 at worst  to increase tolerance for high level activities  2.Patient goal: Return to frisbee and working out w/o complaint  3.Increase strength to 4+/5 in  hip  ER/Ext/Abd  to increase tolerance for ADL and work activities.  4. Pt will report at CJ level (20-40% impaired) on LEFS  to demonstrate increase in LE function with every day tasks.     Plan     See POC in treatment section for evaluation    TRA PICHARDO, PT , DPT, OCS

## 2022-03-15 LAB
COMMENT: NORMAL
FINAL PATHOLOGIC DIAGNOSIS: NORMAL
GROSS: NORMAL
Lab: NORMAL

## 2022-03-18 ENCOUNTER — CLINICAL SUPPORT (OUTPATIENT)
Dept: REHABILITATION | Facility: HOSPITAL | Age: 37
End: 2022-03-18
Payer: MEDICAID

## 2022-03-18 DIAGNOSIS — M25.551 PAIN IN RIGHT HIP: Primary | ICD-10-CM

## 2022-03-18 DIAGNOSIS — M54.50 LOW BACK PAIN WITHOUT SCIATICA, UNSPECIFIED BACK PAIN LATERALITY, UNSPECIFIED CHRONICITY: ICD-10-CM

## 2022-03-18 PROCEDURE — 97110 THERAPEUTIC EXERCISES: CPT

## 2022-03-18 NOTE — PROGRESS NOTES
Physical Therapy Daily Treatment Note     Name: Dali Cadena  [facility] Number: [id number]    Therapy Diagnosis: No diagnosis found.  Physician: Edison Dsouza MD    Visit Date: 3/18/2022  Physician Orders: PT Eval and Treat   Medical Diagnosis from Referral: M25.551 (ICD-10-CM) - Right hip pain  Evaluation Date: 2/2/2022  Authorization Period Expiration: 02/16/2022  Plan of Care Expiration: 5/2/2022  Visit # / Visits authorized: 5/12      Time In: 1315  Time Out: 1405  Total Appointment Time (timed & untimed codes): 50 minutes - 25 min one on one therex     Precautions: Standard    Subjective     Pt reports: Overall feels better since start of care. Feels like she can activate her glutes more still, but cont to get latency pain with loading squatting / leg press.    FOTO IE - 66%  FOTO 5 -  69%    Goals - SQUAT / LEG PRESS LOADING    She was compliant with home exercise program.  Response to previous treatment: HEP compliance  Functional change: no changes    Pain: 3/10  Location: right hip      Objective     SLR SIJ stability - bilaterally; + on IE  Hip flexion ROM: R has slight ER motion; L is pure flexion    Dali received therapeutic exercises to develop strength, endurance, ROM, flexibility, posture and core stabilization for 45 min (25 minutes one on one) including:     Patient education - mobility -> stability  Patient education - stretching prior to loading / only loading with LP OR squats not both  Hip Thrust for ROM 10x10 sec hold  SL Hip abd into wall 10x10 sec  Single leg clam at wall 3x10 ea  DL Leg press 100-120 lbs 4x10 total - ER and mobility loading.    Pressback adductor stretch 3x30 se  Butterfly stretch 3x30 sec    Next Visit - Sideplank clam hold GTB    Held:  Hip L's 3x fail ea side  Bike x8 min Lvl 4.0 for LE endurance and joint mobility  Mobility squat for ROM 20x3 sec  Bird dog holds 3x30 sec ea     Dali received the following manual therapy techniques: Joint mobilizations were applied to the:  R hip for 5 minutes, including:     Assessment  Traction / inferior glides  Prem stretch  Prone anterior glides Grade II-III    Home Exercises Provided and Patient Education Provided     Education provided:   - see above    Written Home Exercises Provided: Patient instructed to cont prior HEP.  Exercises were reviewed and Dali was able to demonstrate them prior to the end of the session.  Dali demonstrated good  understanding of the education provided.     See EMR under Patient Instructions for exercises provided prior visit - Single limb/sidelying hip abd isos above    Assessment     Demonstrates improving glute activation and hip stability overall. GHJ arthrokinematics into hip flexion affecting loading mechanics into squatting and leg press with R having inadequate pure hip flexion. Improved kinematics following adductor stretching.    With improving stability of hips needs progressions to loading into deeper ROM without irritating labral structures.    Dali Is progressing well towards her goals.   Pt prognosis is Fair.     Pt will continue to benefit from skilled outpatient physical therapy to address the deficits listed in the problem list box on initial evaluation, provide pt/family education and to maximize pt's level of independence in the home and community environment.     Pt's spiritual, cultural and educational needs considered and pt agreeable to plan of care and goals.    Anticipated barriers to physical therapy: chronicity    GOALS: Short Term Goals:  0-4 weeks  1.Report decreased R hip pain  < / =  3/10  At worst to increase tolerance for high level activities  2. Increase ROM to WNL degrees where limited in order to perform ADLs without difficulty.  3. Increase strength by 1/3 MMT grade in hip ER/Ext/Abd  to increase tolerance for ADL and work activities.  4. Pt to tolerate HEP to improve ROM and independence with ADL's     Long Term Goals: 5-8 weeks  1.Report decreased R hip pain < / = 1/10 at  worst  to increase tolerance for high level activities  2.Patient goal: Return to frisbee and working out w/o complaint  3.Increase strength to 4+/5 in  hip ER/Ext/Abd  to increase tolerance for ADL and work activities.  4. Pt will report at CJ level (20-40% impaired) on LEFS  to demonstrate increase in LE function with every day tasks.     Plan     See POC in treatment section for evaluation    TRA PICHARDO, PT , DPT, OCS

## 2022-04-07 ENCOUNTER — TELEPHONE (OUTPATIENT)
Dept: OBSTETRICS AND GYNECOLOGY | Facility: CLINIC | Age: 37
End: 2022-04-07
Payer: MEDICAID

## 2022-04-19 RX ORDER — METRONIDAZOLE 500 MG/1
500 TABLET ORAL EVERY 12 HOURS
Qty: 14 TABLET | Refills: 0 | Status: SHIPPED | OUTPATIENT
Start: 2022-04-19 | End: 2022-04-26

## 2022-08-15 ENCOUNTER — OFFICE VISIT (OUTPATIENT)
Dept: ORTHOPEDICS | Facility: CLINIC | Age: 37
End: 2022-08-15
Payer: MEDICAID

## 2022-08-15 ENCOUNTER — TELEPHONE (OUTPATIENT)
Dept: SPORTS MEDICINE | Facility: CLINIC | Age: 37
End: 2022-08-15
Payer: MEDICAID

## 2022-08-15 ENCOUNTER — HOSPITAL ENCOUNTER (OUTPATIENT)
Dept: RADIOLOGY | Facility: HOSPITAL | Age: 37
Discharge: HOME | End: 2022-08-15
Attending: ORTHOPAEDIC SURGERY
Payer: MEDICAID

## 2022-08-15 VITALS — BODY MASS INDEX: 19.43 KG/M2 | WEIGHT: 109.69 LBS | HEIGHT: 63 IN

## 2022-08-15 DIAGNOSIS — M50.30 DDD (DEGENERATIVE DISC DISEASE), CERVICAL: ICD-10-CM

## 2022-08-15 DIAGNOSIS — M25.551 PAIN IN RIGHT HIP: Primary | ICD-10-CM

## 2022-08-15 PROCEDURE — 1159F MED LIST DOCD IN RCRD: CPT | Mod: CPTII,,, | Performed by: ORTHOPAEDIC SURGERY

## 2022-08-15 PROCEDURE — 99999 PR PBB SHADOW E&M-EST. PATIENT-LVL II: ICD-10-PCS | Mod: PBBFAC,,, | Performed by: ORTHOPAEDIC SURGERY

## 2022-08-15 PROCEDURE — 72050 XR CERVICAL SPINE AP LAT WITH FLEX EXTEN: ICD-10-PCS | Mod: 26,,, | Performed by: RADIOLOGY

## 2022-08-15 PROCEDURE — 99213 PR OFFICE/OUTPT VISIT, EST, LEVL III, 20-29 MIN: ICD-10-PCS | Mod: S$PBB,,, | Performed by: ORTHOPAEDIC SURGERY

## 2022-08-15 PROCEDURE — 3008F PR BODY MASS INDEX (BMI) DOCUMENTED: ICD-10-PCS | Mod: CPTII,,, | Performed by: ORTHOPAEDIC SURGERY

## 2022-08-15 PROCEDURE — 72050 X-RAY EXAM NECK SPINE 4/5VWS: CPT | Mod: TC

## 2022-08-15 PROCEDURE — 99999 PR PBB SHADOW E&M-EST. PATIENT-LVL II: CPT | Mod: PBBFAC,,, | Performed by: ORTHOPAEDIC SURGERY

## 2022-08-15 PROCEDURE — 3008F BODY MASS INDEX DOCD: CPT | Mod: CPTII,,, | Performed by: ORTHOPAEDIC SURGERY

## 2022-08-15 PROCEDURE — 72050 X-RAY EXAM NECK SPINE 4/5VWS: CPT | Mod: 26,,, | Performed by: RADIOLOGY

## 2022-08-15 PROCEDURE — 99212 OFFICE O/P EST SF 10 MIN: CPT | Mod: PBBFAC | Performed by: ORTHOPAEDIC SURGERY

## 2022-08-15 PROCEDURE — 99213 OFFICE O/P EST LOW 20 MIN: CPT | Mod: S$PBB,,, | Performed by: ORTHOPAEDIC SURGERY

## 2022-08-15 PROCEDURE — 1159F PR MEDICATION LIST DOCUMENTED IN MEDICAL RECORD: ICD-10-PCS | Mod: CPTII,,, | Performed by: ORTHOPAEDIC SURGERY

## 2022-08-15 NOTE — PROGRESS NOTES
The patient returns for follow-up.  She has a history of cervical radiculopathy that we treated conservatively.  Overall in regards to this the patient reports that she is at least 90% better.    Recently she has had bothersome right groin pain.  She notes some weakness in her iliopsoas muscles as well.    On physical examination there is normal pain with internal rotation of the right hip.    Today we discussed options, given a young patient with hip pain I think she may have labral pathology.  I will have her see Dr. Portillo in the Sports Medicine Department for evaluation.

## 2022-08-15 NOTE — TELEPHONE ENCOUNTER
----- Message from Michael Portillo MD sent at 8/15/2022  2:21 PM CDT -----  Absolutely.  We will get her in.    Thanks  Michael  ----- Message -----  From: Vince Swenson MD  Sent: 8/15/2022   2:19 PM CDT  To: Michael Portillo MD    Would you mind seeing her for R hip pain, I think she has labral pathology.

## 2023-03-02 ENCOUNTER — PATIENT MESSAGE (OUTPATIENT)
Dept: OBSTETRICS AND GYNECOLOGY | Facility: CLINIC | Age: 38
End: 2023-03-02
Payer: MEDICAID

## 2023-04-12 ENCOUNTER — OFFICE VISIT (OUTPATIENT)
Dept: OBSTETRICS AND GYNECOLOGY | Facility: CLINIC | Age: 38
End: 2023-04-12
Payer: MEDICAID

## 2023-04-12 ENCOUNTER — LAB VISIT (OUTPATIENT)
Dept: LAB | Facility: OTHER | Age: 38
End: 2023-04-12
Attending: OBSTETRICS & GYNECOLOGY
Payer: MEDICAID

## 2023-04-12 VITALS
WEIGHT: 115.75 LBS | SYSTOLIC BLOOD PRESSURE: 110 MMHG | HEIGHT: 63 IN | DIASTOLIC BLOOD PRESSURE: 78 MMHG | BODY MASS INDEX: 20.51 KG/M2

## 2023-04-12 DIAGNOSIS — N92.6 IRREGULAR MENSTRUAL CYCLE: Primary | ICD-10-CM

## 2023-04-12 DIAGNOSIS — N87.9 CERVIX DYSPLASIA: ICD-10-CM

## 2023-04-12 DIAGNOSIS — N92.6 IRREGULAR MENSTRUAL CYCLE: ICD-10-CM

## 2023-04-12 LAB
DHEA-S SERPL-MCNC: 95.8 UG/DL (ref 74.8–410.2)
ESTRADIOL SERPL-MCNC: 66 PG/ML
FSH SERPL-ACNC: 5.61 MIU/ML
LH SERPL-ACNC: 7.6 MIU/ML
PROLACTIN SERPL IA-MCNC: 7.9 NG/ML (ref 5.2–26.5)
TSH SERPL DL<=0.005 MIU/L-ACNC: 2.08 UIU/ML (ref 0.4–4)

## 2023-04-12 PROCEDURE — 88141 CYTOPATH C/V INTERPRET: CPT | Mod: ,,, | Performed by: PATHOLOGY

## 2023-04-12 PROCEDURE — 84443 ASSAY THYROID STIM HORMONE: CPT | Performed by: OBSTETRICS & GYNECOLOGY

## 2023-04-12 PROCEDURE — 36415 COLL VENOUS BLD VENIPUNCTURE: CPT | Performed by: OBSTETRICS & GYNECOLOGY

## 2023-04-12 PROCEDURE — 99999 PR PBB SHADOW E&M-EST. PATIENT-LVL III: ICD-10-PCS | Mod: PBBFAC,,, | Performed by: OBSTETRICS & GYNECOLOGY

## 2023-04-12 PROCEDURE — 83002 ASSAY OF GONADOTROPIN (LH): CPT | Performed by: OBSTETRICS & GYNECOLOGY

## 2023-04-12 PROCEDURE — 1159F PR MEDICATION LIST DOCUMENTED IN MEDICAL RECORD: ICD-10-PCS | Mod: CPTII,,, | Performed by: OBSTETRICS & GYNECOLOGY

## 2023-04-12 PROCEDURE — 87624 HPV HI-RISK TYP POOLED RSLT: CPT | Performed by: OBSTETRICS & GYNECOLOGY

## 2023-04-12 PROCEDURE — 88175 CYTOPATH C/V AUTO FLUID REDO: CPT | Performed by: PATHOLOGY

## 2023-04-12 PROCEDURE — 99213 OFFICE O/P EST LOW 20 MIN: CPT | Mod: PBBFAC | Performed by: OBSTETRICS & GYNECOLOGY

## 2023-04-12 PROCEDURE — 3078F PR MOST RECENT DIASTOLIC BLOOD PRESSURE < 80 MM HG: ICD-10-PCS | Mod: CPTII,,, | Performed by: OBSTETRICS & GYNECOLOGY

## 2023-04-12 PROCEDURE — 99999 PR PBB SHADOW E&M-EST. PATIENT-LVL III: CPT | Mod: PBBFAC,,, | Performed by: OBSTETRICS & GYNECOLOGY

## 2023-04-12 PROCEDURE — 99214 PR OFFICE/OUTPT VISIT, EST, LEVL IV, 30-39 MIN: ICD-10-PCS | Mod: S$PBB,,, | Performed by: OBSTETRICS & GYNECOLOGY

## 2023-04-12 PROCEDURE — 1160F PR REVIEW ALL MEDS BY PRESCRIBER/CLIN PHARMACIST DOCUMENTED: ICD-10-PCS | Mod: CPTII,,, | Performed by: OBSTETRICS & GYNECOLOGY

## 2023-04-12 PROCEDURE — 83036 HEMOGLOBIN GLYCOSYLATED A1C: CPT | Performed by: OBSTETRICS & GYNECOLOGY

## 2023-04-12 PROCEDURE — 1159F MED LIST DOCD IN RCRD: CPT | Mod: CPTII,,, | Performed by: OBSTETRICS & GYNECOLOGY

## 2023-04-12 PROCEDURE — 88141 PR  CYTOPATH CERV/VAG INTERPRET: ICD-10-PCS | Mod: ,,, | Performed by: PATHOLOGY

## 2023-04-12 PROCEDURE — 3074F SYST BP LT 130 MM HG: CPT | Mod: CPTII,,, | Performed by: OBSTETRICS & GYNECOLOGY

## 2023-04-12 PROCEDURE — 3008F PR BODY MASS INDEX (BMI) DOCUMENTED: ICD-10-PCS | Mod: CPTII,,, | Performed by: OBSTETRICS & GYNECOLOGY

## 2023-04-12 PROCEDURE — 84270 ASSAY OF SEX HORMONE GLOBUL: CPT | Performed by: OBSTETRICS & GYNECOLOGY

## 2023-04-12 PROCEDURE — 83498 ASY HYDROXYPROGESTERONE 17-D: CPT | Performed by: OBSTETRICS & GYNECOLOGY

## 2023-04-12 PROCEDURE — 84146 ASSAY OF PROLACTIN: CPT | Performed by: OBSTETRICS & GYNECOLOGY

## 2023-04-12 PROCEDURE — 82670 ASSAY OF TOTAL ESTRADIOL: CPT | Performed by: OBSTETRICS & GYNECOLOGY

## 2023-04-12 PROCEDURE — 82627 DEHYDROEPIANDROSTERONE: CPT | Performed by: OBSTETRICS & GYNECOLOGY

## 2023-04-12 PROCEDURE — 99214 OFFICE O/P EST MOD 30 MIN: CPT | Mod: S$PBB,,, | Performed by: OBSTETRICS & GYNECOLOGY

## 2023-04-12 PROCEDURE — 3008F BODY MASS INDEX DOCD: CPT | Mod: CPTII,,, | Performed by: OBSTETRICS & GYNECOLOGY

## 2023-04-12 PROCEDURE — 3074F PR MOST RECENT SYSTOLIC BLOOD PRESSURE < 130 MM HG: ICD-10-PCS | Mod: CPTII,,, | Performed by: OBSTETRICS & GYNECOLOGY

## 2023-04-12 PROCEDURE — 83001 ASSAY OF GONADOTROPIN (FSH): CPT | Performed by: OBSTETRICS & GYNECOLOGY

## 2023-04-12 PROCEDURE — 84403 ASSAY OF TOTAL TESTOSTERONE: CPT | Performed by: OBSTETRICS & GYNECOLOGY

## 2023-04-12 PROCEDURE — 3078F DIAST BP <80 MM HG: CPT | Mod: CPTII,,, | Performed by: OBSTETRICS & GYNECOLOGY

## 2023-04-12 PROCEDURE — 1160F RVW MEDS BY RX/DR IN RCRD: CPT | Mod: CPTII,,, | Performed by: OBSTETRICS & GYNECOLOGY

## 2023-04-12 RX ORDER — FLUTICASONE PROPIONATE 50 MCG
SPRAY, SUSPENSION (ML) NASAL
COMMUNITY
Start: 2022-11-28

## 2023-04-12 RX ORDER — DEXTROAMPHETAMINE SULFATE, DEXTROAMPHETAMINE SACCHARATE, AMPHETAMINE SULFATE AND AMPHETAMINE ASPARTATE 1.25; 1.25; 1.25; 1.25 MG/1; MG/1; MG/1; MG/1
CAPSULE, EXTENDED RELEASE ORAL EVERY MORNING
COMMUNITY
Start: 2023-04-11

## 2023-04-12 RX ORDER — LEVOCETIRIZINE DIHYDROCHLORIDE 5 MG/1
5 TABLET, FILM COATED ORAL
COMMUNITY
Start: 2022-11-28

## 2023-04-12 NOTE — PROGRESS NOTES
"Irregular cycles    Dali Cadena is a 37 y.o. female  presents for  irregular cycles  She has increased irritability.  Having fatigue.  Mood changes.  Increased hair growth on her chin.   She is waking up at night.     History reviewed. No pertinent past medical history.    History reviewed. No pertinent surgical history.    OB History    Para Term  AB Living   1 1 1     1   SAB IAB Ectopic Multiple Live Births           1      # Outcome Date GA Lbr Tobi/2nd Weight Sex Delivery Anes PTL Lv   1 Term 16 42w0d   F Vag-Spont EPI  GER       Family History   Problem Relation Age of Onset    Stroke Paternal Grandmother     Hypertension Paternal Grandmother        Social History     Tobacco Use    Smoking status: Never    Smokeless tobacco: Never   Substance Use Topics    Alcohol use: Not Currently    Drug use: Never       /78   Ht 5' 3" (1.6 m)   Wt 52.5 kg (115 lb 11.9 oz)   LMP 2023 (Exact Date) Comment: Period stopped on  per the patient  BMI 20.50 kg/m²     ROS:  GENERAL: Denies weight gain or weight loss. Feeling well overall.   SKIN: Denies rash or lesions.   HEAD: Denies head injury or headache.   NODES: Denies enlarged lymph nodes.   CHEST: Denies chest pain or shortness of breath.   CARDIOVASCULAR: Denies palpitations or left sided chest pain.   ABDOMEN: No abdominal pain, constipation, diarrhea, nausea, vomiting or rectal bleeding.   URINARY: No frequency, dysuria, hematuria, or burning on urination.  REPRODUCTIVE: See HPI.   BREASTS: The patient performs breast self-examination and denies pain, lumps, or nipple discharge.   HEMATOLOGIC: No easy bruisability or excessive bleeding.  MUSCULOSKELETAL: Denies joint pain or swelling.   NEUROLOGIC: Denies syncope or weakness.   PSYCHIATRIC: Denies depression, anxiety or mood swings.    Physical Exam:    APPEARANCE: Well nourished, well developed, in no acute distress.  AFFECT: WNL, alert and oriented x 3  SKIN: No acne or " hirsutism  NECK: Neck symmetric without masses or thyromegaly  NODES: No inguinal, cervical, axillary, or femoral lymph node enlargement  CHEST: Good respiratory effect  ABDOMEN: Soft.  No tenderness or masses.  No hepatosplenomegaly.  No hernias.  PELVIC: Normal external genitalia without lesions.  Normal hair distribution.  Adequate perineal body, normal urethral meatus.  Vagina moist and well rugated without lesions or discharge.  Cervix pink, without lesions, discharge or tenderness.  No significant cystocele or rectocele.  Bimanual exam shows uterus to be normal size, regular, mobile and nontender.  Adnexa without masses or tenderness.    EXTREMITIES: No edema.      ASSESSMENT AND PLAN  1. Irregular menstrual cycle  17-Hydroxyprogesterone    DHEA-Sulfate    Estradiol    Follicle Stimulating Hormone    Luteinizing Hormone    Testosterone Panel    TSH    Hemoglobin A1C    Prolactin    Liquid-Based Pap Smear, Screening      2. Cervix dysplasia  Liquid-Based Pap Smear, Screening        Dicussed possible Premature ovarian failure, possible irregular cycles, follow up on labs  Follow up on abnormal pap- last pap was low grade dysplasia  Patient was counseled today on A.C.S. Pap guidelines and recommendations for yearly pelvic exams, mammograms and monthly self breast exams; to see her PCP for other health maintenance.     Follow up if symptoms worsen or fail to improve.

## 2023-04-13 LAB
ESTIMATED AVG GLUCOSE: 97 MG/DL (ref 68–131)
HBA1C MFR BLD: 5 % (ref 4–5.6)

## 2023-04-17 LAB — 17OHP SERPL-MCNC: <31 NG/DL (ref 35–413)

## 2023-04-19 ENCOUNTER — PATIENT MESSAGE (OUTPATIENT)
Dept: OBSTETRICS AND GYNECOLOGY | Facility: CLINIC | Age: 38
End: 2023-04-19
Payer: MEDICAID

## 2023-04-20 ENCOUNTER — TELEPHONE (OUTPATIENT)
Dept: OBSTETRICS AND GYNECOLOGY | Facility: CLINIC | Age: 38
End: 2023-04-20
Payer: MEDICAID

## 2023-04-20 DIAGNOSIS — N92.6 IRREGULAR BLEEDING: Primary | ICD-10-CM

## 2023-04-20 NOTE — TELEPHONE ENCOUNTER
Your labs looks fine.  The level is not your progesterone level . It is a hormone of the adrenal gland and it is low  I will refer you over the endocrinology at the [facility]

## 2023-04-20 NOTE — TELEPHONE ENCOUNTER
Hello,     Hope all is well. What do you think about the results of my progesterone test?  Anything I should do?

## 2023-04-21 LAB
ALBUMIN SERPL-MCNC: 4.8 G/DL (ref 3.6–5.1)
FINAL PATHOLOGIC DIAGNOSIS: ABNORMAL
Lab: ABNORMAL
SHBG SERPL-SCNC: 70 NMOL/L (ref 17–124)
TESTOST FREE SERPL-MCNC: 1.4 PG/ML (ref 0.2–5)
TESTOST SERPL-MCNC: 22 NG/DL (ref 2–45)
TESTOSTERONE.FREE+WB SERPL-MCNC: 3 NG/DL (ref 0.5–8.5)

## 2023-04-25 LAB
HPV HR 12 DNA SPEC QL NAA+PROBE: NEGATIVE
HPV16 AG SPEC QL: NEGATIVE
HPV18 DNA SPEC QL NAA+PROBE: NEGATIVE

## 2023-05-22 NOTE — PROCEDURES
Colposcopy-Today    Date/Time: 3/3/2022 1:00 PM  Performed by: Skylar Solorzano MD  Authorized by: Skylar Solorzano MD     Consent Done?:  Yes (Written)  Timeout:Immediately prior to procedure a time out was called to verify the correct patient, procedure, equipment, support staff and site/side marked as required  Prep:Patient was prepped and draped in the usual sterile fashion  Assistants?: No      Colposcopy Site:  Cervix  Position:  Supine   Patient was prepped and draped in the normal sterile fashion.  Acrowhite Lesion? Yes    Atypical Vessels: No    Transformation Zone Adequate?: Yes    Biopsy?: Yes         Location:  Cervix ((6 00 and 11 00))  ECC Performed?: Yes    LEEP Performed?: No    Estimated blood loss (cc):  5   Patient tolerated the procedure well with no immediate complications.      
No

## 2023-07-22 DIAGNOSIS — M25.551 PAIN IN RIGHT HIP: ICD-10-CM

## 2023-07-24 RX ORDER — MELOXICAM 15 MG/1
15 TABLET ORAL DAILY
Qty: 30 TABLET | Refills: 0 | Status: SHIPPED | OUTPATIENT
Start: 2023-07-24 | End: 2023-08-30 | Stop reason: SDUPTHER

## 2023-08-03 ENCOUNTER — HOSPITAL ENCOUNTER (OUTPATIENT)
Dept: RADIOLOGY | Facility: HOSPITAL | Age: 38
Discharge: HOME | End: 2023-08-03
Attending: ORTHOPAEDIC SURGERY
Payer: MEDICAID

## 2023-08-03 DIAGNOSIS — M50.30 DDD (DEGENERATIVE DISC DISEASE), CERVICAL: ICD-10-CM

## 2023-08-03 PROCEDURE — 72050 X-RAY EXAM NECK SPINE 4/5VWS: CPT | Mod: 26,,, | Performed by: RADIOLOGY

## 2023-08-03 PROCEDURE — 72050 XR CERVICAL SPINE AP LAT WITH FLEX EXTEN: ICD-10-PCS | Mod: 26,,, | Performed by: RADIOLOGY

## 2023-08-03 PROCEDURE — 72050 X-RAY EXAM NECK SPINE 4/5VWS: CPT | Mod: TC

## 2023-08-04 ENCOUNTER — OFFICE VISIT (OUTPATIENT)
Dept: ORTHOPEDICS | Facility: CLINIC | Age: 38
End: 2023-08-04
Payer: MEDICAID

## 2023-08-04 VITALS — WEIGHT: 114.06 LBS | HEIGHT: 63 IN | BODY MASS INDEX: 20.21 KG/M2

## 2023-08-04 DIAGNOSIS — M54.2 NECK PAIN: Primary | ICD-10-CM

## 2023-08-04 PROCEDURE — 99999 PR PBB SHADOW E&M-EST. PATIENT-LVL III: ICD-10-PCS | Mod: PBBFAC,,, | Performed by: ORTHOPAEDIC SURGERY

## 2023-08-04 PROCEDURE — 3008F PR BODY MASS INDEX (BMI) DOCUMENTED: ICD-10-PCS | Mod: CPTII,,, | Performed by: ORTHOPAEDIC SURGERY

## 2023-08-04 PROCEDURE — 1159F MED LIST DOCD IN RCRD: CPT | Mod: CPTII,,, | Performed by: ORTHOPAEDIC SURGERY

## 2023-08-04 PROCEDURE — 1159F PR MEDICATION LIST DOCUMENTED IN MEDICAL RECORD: ICD-10-PCS | Mod: CPTII,,, | Performed by: ORTHOPAEDIC SURGERY

## 2023-08-04 PROCEDURE — 99213 OFFICE O/P EST LOW 20 MIN: CPT | Mod: PBBFAC | Performed by: ORTHOPAEDIC SURGERY

## 2023-08-04 PROCEDURE — 99214 PR OFFICE/OUTPT VISIT, EST, LEVL IV, 30-39 MIN: ICD-10-PCS | Mod: S$PBB,,, | Performed by: ORTHOPAEDIC SURGERY

## 2023-08-04 PROCEDURE — 3044F PR MOST RECENT HEMOGLOBIN A1C LEVEL <7.0%: ICD-10-PCS | Mod: CPTII,,, | Performed by: ORTHOPAEDIC SURGERY

## 2023-08-04 PROCEDURE — 99999 PR PBB SHADOW E&M-EST. PATIENT-LVL III: CPT | Mod: PBBFAC,,, | Performed by: ORTHOPAEDIC SURGERY

## 2023-08-04 PROCEDURE — 3008F BODY MASS INDEX DOCD: CPT | Mod: CPTII,,, | Performed by: ORTHOPAEDIC SURGERY

## 2023-08-04 PROCEDURE — 3044F HG A1C LEVEL LT 7.0%: CPT | Mod: CPTII,,, | Performed by: ORTHOPAEDIC SURGERY

## 2023-08-04 PROCEDURE — 99214 OFFICE O/P EST MOD 30 MIN: CPT | Mod: S$PBB,,, | Performed by: ORTHOPAEDIC SURGERY

## 2023-08-04 RX ORDER — METHOCARBAMOL 750 MG/1
750 TABLET, FILM COATED ORAL NIGHTLY PRN
Qty: 40 TABLET | Refills: 0 | Status: SHIPPED | OUTPATIENT
Start: 2023-08-04 | End: 2023-09-13

## 2023-08-04 RX ORDER — METHOCARBAMOL 500 MG/1
500 TABLET, FILM COATED ORAL 2 TIMES DAILY PRN
Qty: 40 TABLET | Refills: 0 | Status: SHIPPED | OUTPATIENT
Start: 2023-08-04 | End: 2023-08-04 | Stop reason: SDUPTHER

## 2023-08-04 NOTE — PROGRESS NOTES
DATE: 8/4/2023  PATIENT: Dali Cadena    Attending Physician: Vince Swenson M.D.    CHIEF COMPLAINT:  Neck pain    HISTORY:  Dali Cadena is a 38 y.o. female previously seen in spine clinic for cervical radiculopathy now here for initial evaluation of neck pain (Back - 6). The pain has been present for since May after [place] in which she thinks she may have hyperextended her neck. The patient describes the pain as worse at night after a full day of activity.  The pain is somewhat better with meloxicam but remains persistent at night. There is no associated numbness and tingling. There is no subjective weakness. Prior treatments have included oral anti-inflammatories, but no formal therapy or injections.    The Patient denies myelopathic symptoms such as handwriting changes or difficulty with buttons/coins/keys. Denies perineal paresthesias, bowel/bladder dysfunction.    PAST MEDICAL/SURGICAL HISTORY:  History reviewed. No pertinent past medical history.  History reviewed. No pertinent surgical history.    Current Medications:   Current Outpatient Medications:     ADDERALL XR 5 mg 24 hr capsule, Take by mouth every morning., Disp: , Rfl:     dextroamphetamine-amphetamine (ADDERALL XR) 10 MG 24 hr capsule, TK 1 C PO QD IN THE MORNING, Disp: , Rfl:     fluticasone propionate (FLONASE) 50 mcg/actuation nasal spray, by Each Nostril route., Disp: , Rfl:     levocetirizine (XYZAL) 5 MG tablet, Take 5 mg by mouth., Disp: , Rfl:     meloxicam (MOBIC) 15 MG tablet, Take 1 tablet (15 mg total) by mouth once daily., Disp: 30 tablet, Rfl: 0    methocarbamoL (ROBAXIN) 500 MG Tab, Take 1 tablet (500 mg total) by mouth 2 (two) times daily as needed., Disp: 40 tablet, Rfl: 0    Social History:   Social History     Socioeconomic History    Marital status:    Tobacco Use    Smoking status: Never    Smokeless tobacco: Never   Substance and Sexual Activity    Alcohol use: Not Currently    Drug use: Never     "Sexual activity: Yes     Partners: Male         PHYSICAL EXAMINATION:    Ht 5' 3" (1.6 m)   Wt 51.8 kg (114 lb 1.4 oz)   BMI 20.21 kg/m²     Skin: Dorsal lumbar skin negative for rashes, lesions, hairy patches and surgical scars.  Range of motion: Lumbar range of motion is acceptable. There is mild paraspinal cervical tenderness to palpation.  Spinal Balance: Global saggital and coronal spinal balance acceptable, no significant for scoliosis and kyphosis.  Musculoskeletal: No pain with the range of motion of the bilateral hips. No trochanteric tenderness to palpation.  Neurological: Normal strength and tone in all major motor groups in the bilateral lower extremities. Normal sensation to light touch in the L2-S1 dermatomes bilaterally.  Deep tendon reflexes symmetric in the bilateral upper and lower extremities.  Negative Babinski bilaterally.    IMAGING:   Today I personally reviewed AP, Lat and Flex/Ex upright C-spine films that demonstrate no acute abnormalities.      Body mass index is 20.21 kg/m².  Hemoglobin A1C   Date Value Ref Range Status   04/12/2023 5.0 4.0 - 5.6 % Final     Comment:     ADA Screening Guidelines:  5.7-6.4%  Consistent with prediabetes  >or=6.5%  Consistent with diabetes    High levels of fetal hemoglobin interfere with the HbA1C  assay. Heterozygous hemoglobin variants (HbS, HgC, etc)do  not significantly interfere with this assay.   However, presence of multiple variants may affect accuracy.           ASSESSMENT/PLAN:    Diagnoses and all orders for this visit:    Neck pain  -     Ambulatory referral/consult to Physical/Occupational Therapy; Future    Other orders  -     methocarbamoL (ROBAXIN) 500 MG Tab; Take 1 tablet (500 mg total) by mouth 2 (two) times daily as needed.      No follow-ups on file.    Patient reports today after fall in May in which she may have hyperextended her neck and continues to have nighttime trap and neck pain.  She is no radicular symptoms at this time.  " She is been treating with oral anti-inflammatories with minimal relief.  We discussed a multimodal approach including meloxicam, Tylenol, and Robaxin.  In addition to this we will refer her to physical therapy she previously underwent formal therapy with excellent results.  We will see her back in a few weeks to see how she is doing.  If no improvement at that time we will proceed with MRI of the cervical spine.

## 2023-08-30 DIAGNOSIS — M25.551 PAIN IN RIGHT HIP: ICD-10-CM

## 2023-08-30 RX ORDER — MELOXICAM 15 MG/1
15 TABLET ORAL DAILY
Qty: 30 TABLET | Refills: 0 | Status: SHIPPED | OUTPATIENT
Start: 2023-08-30 | End: 2023-09-28

## 2023-09-28 DIAGNOSIS — M25.551 PAIN IN RIGHT HIP: ICD-10-CM

## 2023-09-28 RX ORDER — MELOXICAM 15 MG/1
15 TABLET ORAL
Qty: 30 TABLET | Refills: 0 | Status: SHIPPED | OUTPATIENT
Start: 2023-09-28 | End: 2024-02-28 | Stop reason: SDUPTHER

## 2024-02-28 DIAGNOSIS — M25.551 PAIN IN RIGHT HIP: ICD-10-CM

## 2024-02-28 RX ORDER — MELOXICAM 15 MG/1
15 TABLET ORAL DAILY
Qty: 30 TABLET | Refills: 0 | Status: SHIPPED | OUTPATIENT
Start: 2024-02-28

## 2025-02-19 ENCOUNTER — HOSPITAL ENCOUNTER (OUTPATIENT)
Dept: RADIOLOGY | Facility: OTHER | Age: 40
Discharge: HOME | End: 2025-02-19
Payer: MEDICAID

## 2025-02-19 DIAGNOSIS — Z12.31 ENCOUNTER FOR SCREENING MAMMOGRAM FOR BREAST CANCER: ICD-10-CM

## 2025-02-19 PROCEDURE — 77067 SCR MAMMO BI INCL CAD: CPT | Mod: TC
